# Patient Record
Sex: FEMALE | Race: BLACK OR AFRICAN AMERICAN | NOT HISPANIC OR LATINO | URBAN - METROPOLITAN AREA
[De-identification: names, ages, dates, MRNs, and addresses within clinical notes are randomized per-mention and may not be internally consistent; named-entity substitution may affect disease eponyms.]

---

## 2017-03-27 ENCOUNTER — EMERGENCY (EMERGENCY)
Facility: HOSPITAL | Age: 27
LOS: 1 days | Discharge: HOME | End: 2017-03-27
Attending: EMERGENCY MEDICINE | Admitting: EMERGENCY MEDICINE
Payer: MEDICAID

## 2017-03-27 VITALS
HEART RATE: 78 BPM | RESPIRATION RATE: 18 BRPM | DIASTOLIC BLOOD PRESSURE: 78 MMHG | SYSTOLIC BLOOD PRESSURE: 136 MMHG | TEMPERATURE: 97 F | OXYGEN SATURATION: 100 %

## 2017-03-27 VITALS
SYSTOLIC BLOOD PRESSURE: 129 MMHG | HEART RATE: 73 BPM | DIASTOLIC BLOOD PRESSURE: 68 MMHG | TEMPERATURE: 98 F | WEIGHT: 223.11 LBS | RESPIRATION RATE: 18 BRPM | OXYGEN SATURATION: 100 %

## 2017-03-27 DIAGNOSIS — Z91.013 ALLERGY TO SEAFOOD: ICD-10-CM

## 2017-03-27 DIAGNOSIS — B37.3 CANDIDIASIS OF VULVA AND VAGINA: ICD-10-CM

## 2017-03-27 DIAGNOSIS — L29.2 PRURITUS VULVAE: ICD-10-CM

## 2017-03-27 LAB
APPEARANCE UR: CLEAR — SIGNIFICANT CHANGE UP
BILIRUB UR-MCNC: NEGATIVE — SIGNIFICANT CHANGE UP
COLOR SPEC: YELLOW — SIGNIFICANT CHANGE UP
DIFF PNL FLD: NEGATIVE — SIGNIFICANT CHANGE UP
EXTRA URINE TUBE: SIGNIFICANT CHANGE UP
GLUCOSE UR QL: NEGATIVE — SIGNIFICANT CHANGE UP
KETONES UR-MCNC: NEGATIVE — SIGNIFICANT CHANGE UP
LEUKOCYTE ESTERASE UR-ACNC: (no result)
NITRITE UR-MCNC: NEGATIVE — SIGNIFICANT CHANGE UP
PH UR: 7 — SIGNIFICANT CHANGE UP (ref 4–8)
PROT UR-MCNC: NEGATIVE MG/DL — SIGNIFICANT CHANGE UP
SP GR SPEC: 1.01 — SIGNIFICANT CHANGE UP (ref 1–1.03)
UROBILINOGEN FLD QL: 0.2 E.U./DL — SIGNIFICANT CHANGE UP

## 2017-03-27 PROCEDURE — 99284 EMERGENCY DEPT VISIT MOD MDM: CPT

## 2017-03-27 PROCEDURE — 81001 URINALYSIS AUTO W/SCOPE: CPT

## 2017-03-27 PROCEDURE — 81003 URINALYSIS AUTO W/O SCOPE: CPT

## 2017-03-27 RX ORDER — IBUPROFEN 200 MG
800 TABLET ORAL ONCE
Qty: 0 | Refills: 0 | Status: COMPLETED | OUTPATIENT
Start: 2017-03-27 | End: 2017-03-27

## 2017-03-27 RX ORDER — FLUCONAZOLE 150 MG/1
150 TABLET ORAL ONCE
Qty: 0 | Refills: 0 | Status: COMPLETED | OUTPATIENT
Start: 2017-03-27 | End: 2017-03-27

## 2017-03-27 RX ADMIN — Medication 800 MILLIGRAM(S): at 10:56

## 2017-03-27 RX ADMIN — FLUCONAZOLE 150 MILLIGRAM(S): 150 TABLET ORAL at 10:04

## 2017-03-27 NOTE — ED PROVIDER NOTE - OBJECTIVE STATEMENT
26yoF here with vaginal itching and feels like there are bumps inside her vagina and is concerned for herpes and genital warts. Pt reports several years of dyspareunia, but states she has not addressed it with an OB-GYN. She also has had several months of abnormal smelling vaginal discharge, tried home/natural remedies with some improvement, but now back again, +itching. No dysuria or frequency, sexually active, uses condoms, remote hx of STIs but gets tested regularly and reports 100% compliance with condoms. No abnormal bleeding, did have abnormal pap during pregnancy of her son 3 years ago, scheduled for colposcopy today. No other complaints.

## 2017-03-27 NOTE — ED PROVIDER NOTE - MEDICAL DECISION MAKING DETAILS
26yoF here acutely for vaginal discharge and itching, exam shows vaginal candidiasis, treated with oral fluconazole in ED, gc/chl cultures sent. No lesions on exam noted. Pt referred to Ob-GYN for workup of dyspareunia, abnormal pap, and routine care. return precautions reviewed, verbalized understanding, agrees w/plan.

## 2017-03-27 NOTE — ED PROVIDER NOTE - PLAN OF CARE
you were treated for a yeast infection  drink plenty of water  continue to use condoms for protection   follow-up with the OB-GYN doctor for further outpatient evaluation for your symptoms  return if worse

## 2017-03-27 NOTE — ED PROVIDER NOTE - CARE PLAN
Principal Discharge DX:	Vaginal candidiasis  Instructions for follow-up, activity and diet:	you were treated for a yeast infection  drink plenty of water  continue to use condoms for protection   follow-up with the OB-GYN doctor for further outpatient evaluation for your symptoms  return if worse  Secondary Diagnosis:	Dyspareunia, female

## 2017-03-27 NOTE — ED ADULT TRIAGE NOTE - CHIEF COMPLAINT QUOTE
"I have a lot of burning and pain in my vaginal area on and off for the last year but the worst the last 2 days and there are bumps there" denies fever, chills

## 2017-03-28 LAB
C TRACH RRNA SPEC QL NAA+PROBE: SIGNIFICANT CHANGE UP
N GONORRHOEA RRNA SPEC QL NAA+PROBE: SIGNIFICANT CHANGE UP
SPECIMEN SOURCE: SIGNIFICANT CHANGE UP

## 2020-08-22 ENCOUNTER — TRANSCRIPTION ENCOUNTER (OUTPATIENT)
Age: 30
End: 2020-08-22

## 2020-11-16 ENCOUNTER — APPOINTMENT (OUTPATIENT)
Dept: HEART AND VASCULAR | Facility: CLINIC | Age: 30
End: 2020-11-16
Payer: MEDICAID

## 2020-11-16 VITALS
BODY MASS INDEX: 40.18 KG/M2 | DIASTOLIC BLOOD PRESSURE: 84 MMHG | TEMPERATURE: 97.6 F | SYSTOLIC BLOOD PRESSURE: 120 MMHG | WEIGHT: 250 LBS | HEIGHT: 66 IN | OXYGEN SATURATION: 98 %

## 2020-11-16 PROCEDURE — 99072 ADDL SUPL MATRL&STAF TM PHE: CPT

## 2020-11-16 PROCEDURE — 93000 ELECTROCARDIOGRAM COMPLETE: CPT

## 2020-11-16 PROCEDURE — 99204 OFFICE O/P NEW MOD 45 MIN: CPT

## 2020-11-16 NOTE — HISTORY OF PRESENT ILLNESS
[FreeTextEntry1] : Here for cardiac clearance prior to bariatric surgery next month\par normal functional capacity\par exercises several times a week with a \par no prior problems with anesthesia\par denies chest discomfort dyspnea, orthopnea, PND or palpitations

## 2020-11-16 NOTE — PHYSICAL EXAM
[General Appearance - Well Developed] : well developed [Normal Appearance] : normal appearance [Well Groomed] : well groomed [General Appearance - Well Nourished] : well nourished [No Deformities] : no deformities [General Appearance - In No Acute Distress] : no acute distress [Normal Conjunctiva] : the conjunctiva exhibited no abnormalities [Eyelids - No Xanthelasma] : the eyelids demonstrated no xanthelasmas [Normal Oral Mucosa] : normal oral mucosa [No Oral Pallor] : no oral pallor [No Oral Cyanosis] : no oral cyanosis [Normal Jugular Venous A Waves Present] : normal jugular venous A waves present [Normal Jugular Venous V Waves Present] : normal jugular venous V waves present [No Jugular Venous Linton A Waves] : no jugular venous linton A waves [Heart Rate And Rhythm] : heart rate and rhythm were normal [Heart Sounds] : normal S1 and S2 [Murmurs] : no murmurs present [Respiration, Rhythm And Depth] : normal respiratory rhythm and effort [Exaggerated Use Of Accessory Muscles For Inspiration] : no accessory muscle use [Auscultation Breath Sounds / Voice Sounds] : lungs were clear to auscultation bilaterally [Abdomen Soft] : soft [Abdomen Tenderness] : non-tender [Abdomen Mass (___ Cm)] : no abdominal mass palpated [Abnormal Walk] : normal gait [Gait - Sufficient For Exercise Testing] : the gait was sufficient for exercise testing [Skin Color & Pigmentation] : normal skin color and pigmentation [] : no rash [No Venous Stasis] : no venous stasis [Skin Lesions] : no skin lesions [No Skin Ulcers] : no skin ulcer [No Xanthoma] : no  xanthoma was observed [Oriented To Time, Place, And Person] : oriented to person, place, and time [Affect] : the affect was normal [Mood] : the mood was normal [No Anxiety] : not feeling anxious

## 2020-11-19 ENCOUNTER — APPOINTMENT (OUTPATIENT)
Dept: PULMONOLOGY | Facility: CLINIC | Age: 30
End: 2020-11-19

## 2020-11-23 ENCOUNTER — APPOINTMENT (OUTPATIENT)
Dept: HEART AND VASCULAR | Facility: CLINIC | Age: 30
End: 2020-11-23
Payer: MEDICAID

## 2020-11-23 ENCOUNTER — APPOINTMENT (OUTPATIENT)
Dept: HEART AND VASCULAR | Facility: CLINIC | Age: 30
End: 2020-11-23

## 2020-11-23 DIAGNOSIS — R94.31 ABNORMAL ELECTROCARDIOGRAM [ECG] [EKG]: ICD-10-CM

## 2020-11-23 PROCEDURE — 93306 TTE W/DOPPLER COMPLETE: CPT

## 2020-11-24 PROBLEM — R94.31 ABNORMAL ECG: Status: ACTIVE | Noted: 2020-11-16

## 2020-12-16 ENCOUNTER — APPOINTMENT (OUTPATIENT)
Dept: BARIATRICS | Facility: CLINIC | Age: 30
End: 2020-12-16
Payer: MEDICAID

## 2020-12-16 VITALS — HEIGHT: 66 IN | WEIGHT: 250 LBS | BODY MASS INDEX: 40.18 KG/M2

## 2020-12-16 VITALS — BODY MASS INDEX: 38.57 KG/M2 | HEIGHT: 66 IN | WEIGHT: 240 LBS

## 2020-12-16 PROCEDURE — 99203 OFFICE O/P NEW LOW 30 MIN: CPT

## 2020-12-16 PROCEDURE — 99072 ADDL SUPL MATRL&STAF TM PHE: CPT

## 2020-12-17 ENCOUNTER — APPOINTMENT (OUTPATIENT)
Dept: GASTROENTEROLOGY | Facility: CLINIC | Age: 30
End: 2020-12-17

## 2021-01-14 ENCOUNTER — APPOINTMENT (OUTPATIENT)
Dept: BARIATRICS | Facility: CLINIC | Age: 31
End: 2021-01-14
Payer: MEDICAID

## 2021-01-14 VITALS — WEIGHT: 250 LBS | BODY MASS INDEX: 40.35 KG/M2

## 2021-01-14 DIAGNOSIS — R73.03 PREDIABETES.: ICD-10-CM

## 2021-01-14 PROCEDURE — 97802 MEDICAL NUTRITION INDIV IN: CPT | Mod: 95

## 2021-01-25 ENCOUNTER — APPOINTMENT (OUTPATIENT)
Dept: HEART AND VASCULAR | Facility: CLINIC | Age: 31
End: 2021-01-25

## 2021-01-27 NOTE — HISTORY OF PRESENT ILLNESS
[de-identified] : Patient is a 30 year old female with along history of morbid obesity not responsive to multiple dietary regimens. She has a BMI of 40.4 and weight-related comorbidities including prediabetes.

## 2021-02-08 ENCOUNTER — NON-APPOINTMENT (OUTPATIENT)
Age: 31
End: 2021-02-08

## 2021-04-30 ENCOUNTER — EMERGENCY (EMERGENCY)
Facility: HOSPITAL | Age: 31
LOS: 1 days | Discharge: HOME | End: 2021-04-30
Attending: EMERGENCY MEDICINE | Admitting: EMERGENCY MEDICINE
Payer: MEDICAID

## 2021-04-30 VITALS
DIASTOLIC BLOOD PRESSURE: 81 MMHG | SYSTOLIC BLOOD PRESSURE: 127 MMHG | OXYGEN SATURATION: 98 % | TEMPERATURE: 98 F | HEART RATE: 85 BPM | HEIGHT: 66 IN | RESPIRATION RATE: 17 BRPM | WEIGHT: 220.02 LBS

## 2021-04-30 VITALS
HEART RATE: 70 BPM | OXYGEN SATURATION: 100 % | DIASTOLIC BLOOD PRESSURE: 68 MMHG | RESPIRATION RATE: 16 BRPM | SYSTOLIC BLOOD PRESSURE: 124 MMHG

## 2021-04-30 DIAGNOSIS — D64.9 ANEMIA, UNSPECIFIED: ICD-10-CM

## 2021-04-30 DIAGNOSIS — R10.31 RIGHT LOWER QUADRANT PAIN: ICD-10-CM

## 2021-04-30 DIAGNOSIS — R30.0 DYSURIA: ICD-10-CM

## 2021-04-30 DIAGNOSIS — R10.9 UNSPECIFIED ABDOMINAL PAIN: ICD-10-CM

## 2021-04-30 DIAGNOSIS — M41.9 SCOLIOSIS, UNSPECIFIED: ICD-10-CM

## 2021-04-30 DIAGNOSIS — R11.0 NAUSEA: ICD-10-CM

## 2021-04-30 DIAGNOSIS — R31.9 HEMATURIA, UNSPECIFIED: ICD-10-CM

## 2021-04-30 DIAGNOSIS — Z91.013 ALLERGY TO SEAFOOD: ICD-10-CM

## 2021-04-30 LAB
ALBUMIN SERPL ELPH-MCNC: 3.9 G/DL — SIGNIFICANT CHANGE UP (ref 3.3–5)
ALP SERPL-CCNC: 91 U/L — SIGNIFICANT CHANGE UP (ref 40–120)
ALT FLD-CCNC: 11 U/L — SIGNIFICANT CHANGE UP (ref 10–45)
ANION GAP SERPL CALC-SCNC: 11 MMOL/L — SIGNIFICANT CHANGE UP (ref 5–17)
ANISOCYTOSIS BLD QL: SLIGHT — SIGNIFICANT CHANGE UP
APPEARANCE UR: CLEAR — SIGNIFICANT CHANGE UP
APTT BLD: 32.2 SEC — SIGNIFICANT CHANGE UP (ref 27.5–35.5)
AST SERPL-CCNC: 14 U/L — SIGNIFICANT CHANGE UP (ref 10–40)
BASOPHILS # BLD AUTO: 0.07 K/UL — SIGNIFICANT CHANGE UP (ref 0–0.2)
BASOPHILS NFR BLD AUTO: 0.9 % — SIGNIFICANT CHANGE UP (ref 0–2)
BILIRUB SERPL-MCNC: <0.2 MG/DL — SIGNIFICANT CHANGE UP (ref 0.2–1.2)
BILIRUB UR-MCNC: NEGATIVE — SIGNIFICANT CHANGE UP
BUN SERPL-MCNC: 8 MG/DL — SIGNIFICANT CHANGE UP (ref 7–23)
BURR CELLS BLD QL SMEAR: PRESENT — SIGNIFICANT CHANGE UP
CALCIUM SERPL-MCNC: 9 MG/DL — SIGNIFICANT CHANGE UP (ref 8.4–10.5)
CHLORIDE SERPL-SCNC: 101 MMOL/L — SIGNIFICANT CHANGE UP (ref 96–108)
CO2 SERPL-SCNC: 27 MMOL/L — SIGNIFICANT CHANGE UP (ref 22–31)
COLOR SPEC: YELLOW — SIGNIFICANT CHANGE UP
CREAT SERPL-MCNC: 0.71 MG/DL — SIGNIFICANT CHANGE UP (ref 0.5–1.3)
DIFF PNL FLD: NEGATIVE — SIGNIFICANT CHANGE UP
ELLIPTOCYTES BLD QL SMEAR: SLIGHT — SIGNIFICANT CHANGE UP
EOSINOPHIL # BLD AUTO: 0.07 K/UL — SIGNIFICANT CHANGE UP (ref 0–0.5)
EOSINOPHIL NFR BLD AUTO: 0.9 % — SIGNIFICANT CHANGE UP (ref 0–6)
GLUCOSE SERPL-MCNC: 185 MG/DL — HIGH (ref 70–99)
GLUCOSE UR QL: NEGATIVE — SIGNIFICANT CHANGE UP
HCG SERPL-ACNC: <0 MIU/ML — SIGNIFICANT CHANGE UP
HCT VFR BLD CALC: 28.7 % — LOW (ref 34.5–45)
HGB BLD-MCNC: 8.7 G/DL — LOW (ref 11.5–15.5)
HYPOCHROMIA BLD QL: SLIGHT — SIGNIFICANT CHANGE UP
INR BLD: 1.05 — SIGNIFICANT CHANGE UP (ref 0.88–1.16)
KETONES UR-MCNC: NEGATIVE — SIGNIFICANT CHANGE UP
LEUKOCYTE ESTERASE UR-ACNC: NEGATIVE — SIGNIFICANT CHANGE UP
LIDOCAIN IGE QN: 23 U/L — SIGNIFICANT CHANGE UP (ref 7–60)
LYMPHOCYTES # BLD AUTO: 3.53 K/UL — HIGH (ref 1–3.3)
LYMPHOCYTES # BLD AUTO: 43.8 % — SIGNIFICANT CHANGE UP (ref 13–44)
MANUAL SMEAR VERIFICATION: SIGNIFICANT CHANGE UP
MCHC RBC-ENTMCNC: 21.2 PG — LOW (ref 27–34)
MCHC RBC-ENTMCNC: 30.3 GM/DL — LOW (ref 32–36)
MCV RBC AUTO: 69.8 FL — LOW (ref 80–100)
MICROCYTES BLD QL: SLIGHT — SIGNIFICANT CHANGE UP
MONOCYTES # BLD AUTO: 0.57 K/UL — SIGNIFICANT CHANGE UP (ref 0–0.9)
MONOCYTES NFR BLD AUTO: 7.1 % — SIGNIFICANT CHANGE UP (ref 2–14)
NEUTROPHILS # BLD AUTO: 3.81 K/UL — SIGNIFICANT CHANGE UP (ref 1.8–7.4)
NEUTROPHILS NFR BLD AUTO: 47.3 % — SIGNIFICANT CHANGE UP (ref 43–77)
NITRITE UR-MCNC: NEGATIVE — SIGNIFICANT CHANGE UP
NRBC # BLD: 1 /100 — HIGH (ref 0–0)
NRBC # BLD: SIGNIFICANT CHANGE UP /100 WBCS (ref 0–0)
OVALOCYTES BLD QL SMEAR: SLIGHT — SIGNIFICANT CHANGE UP
PH UR: 6 — SIGNIFICANT CHANGE UP (ref 5–8)
PLAT MORPH BLD: ABNORMAL
PLATELET # BLD AUTO: 417 K/UL — HIGH (ref 150–400)
POIKILOCYTOSIS BLD QL AUTO: SLIGHT — SIGNIFICANT CHANGE UP
POLYCHROMASIA BLD QL SMEAR: SLIGHT — SIGNIFICANT CHANGE UP
POTASSIUM SERPL-MCNC: 4 MMOL/L — SIGNIFICANT CHANGE UP (ref 3.5–5.3)
POTASSIUM SERPL-SCNC: 4 MMOL/L — SIGNIFICANT CHANGE UP (ref 3.5–5.3)
PROT SERPL-MCNC: 7.1 G/DL — SIGNIFICANT CHANGE UP (ref 6–8.3)
PROT UR-MCNC: NEGATIVE MG/DL — SIGNIFICANT CHANGE UP
PROTHROM AB SERPL-ACNC: 12.6 SEC — SIGNIFICANT CHANGE UP (ref 10.6–13.6)
RBC # BLD: 4.11 M/UL — SIGNIFICANT CHANGE UP (ref 3.8–5.2)
RBC # FLD: 16.1 % — HIGH (ref 10.3–14.5)
RBC BLD AUTO: ABNORMAL
SCHISTOCYTES BLD QL AUTO: SLIGHT — SIGNIFICANT CHANGE UP
SMUDGE CELLS # BLD: PRESENT — SIGNIFICANT CHANGE UP
SODIUM SERPL-SCNC: 139 MMOL/L — SIGNIFICANT CHANGE UP (ref 135–145)
SP GR SPEC: 1.02 — SIGNIFICANT CHANGE UP (ref 1–1.03)
UROBILINOGEN FLD QL: 0.2 E.U./DL — SIGNIFICANT CHANGE UP
WBC # BLD: 8.06 K/UL — SIGNIFICANT CHANGE UP (ref 3.8–10.5)
WBC # FLD AUTO: 8.06 K/UL — SIGNIFICANT CHANGE UP (ref 3.8–10.5)

## 2021-04-30 PROCEDURE — 74176 CT ABD & PELVIS W/O CONTRAST: CPT

## 2021-04-30 PROCEDURE — 85610 PROTHROMBIN TIME: CPT

## 2021-04-30 PROCEDURE — 87184 SC STD DISK METHOD PER PLATE: CPT

## 2021-04-30 PROCEDURE — 96375 TX/PRO/DX INJ NEW DRUG ADDON: CPT

## 2021-04-30 PROCEDURE — 85025 COMPLETE CBC W/AUTO DIFF WBC: CPT

## 2021-04-30 PROCEDURE — 81003 URINALYSIS AUTO W/O SCOPE: CPT

## 2021-04-30 PROCEDURE — 85730 THROMBOPLASTIN TIME PARTIAL: CPT

## 2021-04-30 PROCEDURE — 36415 COLL VENOUS BLD VENIPUNCTURE: CPT

## 2021-04-30 PROCEDURE — 87086 URINE CULTURE/COLONY COUNT: CPT

## 2021-04-30 PROCEDURE — G1004: CPT

## 2021-04-30 PROCEDURE — 99285 EMERGENCY DEPT VISIT HI MDM: CPT

## 2021-04-30 PROCEDURE — 99284 EMERGENCY DEPT VISIT MOD MDM: CPT | Mod: 25

## 2021-04-30 PROCEDURE — 96361 HYDRATE IV INFUSION ADD-ON: CPT

## 2021-04-30 PROCEDURE — 84702 CHORIONIC GONADOTROPIN TEST: CPT

## 2021-04-30 PROCEDURE — 83690 ASSAY OF LIPASE: CPT

## 2021-04-30 PROCEDURE — 80053 COMPREHEN METABOLIC PANEL: CPT

## 2021-04-30 PROCEDURE — 96374 THER/PROPH/DIAG INJ IV PUSH: CPT

## 2021-04-30 PROCEDURE — 74176 CT ABD & PELVIS W/O CONTRAST: CPT | Mod: 26,ME

## 2021-04-30 RX ORDER — SODIUM CHLORIDE 9 MG/ML
1000 INJECTION INTRAMUSCULAR; INTRAVENOUS; SUBCUTANEOUS ONCE
Refills: 0 | Status: COMPLETED | OUTPATIENT
Start: 2021-04-30 | End: 2021-04-30

## 2021-04-30 RX ORDER — MORPHINE SULFATE 50 MG/1
4 CAPSULE, EXTENDED RELEASE ORAL ONCE
Refills: 0 | Status: DISCONTINUED | OUTPATIENT
Start: 2021-04-30 | End: 2021-04-30

## 2021-04-30 RX ORDER — OXYCODONE AND ACETAMINOPHEN 5; 325 MG/1; MG/1
1 TABLET ORAL ONCE
Refills: 0 | Status: DISCONTINUED | OUTPATIENT
Start: 2021-04-30 | End: 2021-04-30

## 2021-04-30 RX ORDER — CEFUROXIME AXETIL 250 MG
1 TABLET ORAL
Qty: 20 | Refills: 0
Start: 2021-04-30 | End: 2021-05-09

## 2021-04-30 RX ORDER — KETOROLAC TROMETHAMINE 30 MG/ML
30 SYRINGE (ML) INJECTION ONCE
Refills: 0 | Status: DISCONTINUED | OUTPATIENT
Start: 2021-04-30 | End: 2021-04-30

## 2021-04-30 RX ADMIN — MORPHINE SULFATE 4 MILLIGRAM(S): 50 CAPSULE, EXTENDED RELEASE ORAL at 03:26

## 2021-04-30 RX ADMIN — SODIUM CHLORIDE 1000 MILLILITER(S): 9 INJECTION INTRAMUSCULAR; INTRAVENOUS; SUBCUTANEOUS at 03:25

## 2021-04-30 RX ADMIN — Medication 30 MILLIGRAM(S): at 06:28

## 2021-04-30 RX ADMIN — OXYCODONE AND ACETAMINOPHEN 1 TABLET(S): 5; 325 TABLET ORAL at 08:17

## 2021-04-30 RX ADMIN — SODIUM CHLORIDE 1000 MILLILITER(S): 9 INJECTION INTRAMUSCULAR; INTRAVENOUS; SUBCUTANEOUS at 05:45

## 2021-04-30 RX ADMIN — MORPHINE SULFATE 4 MILLIGRAM(S): 50 CAPSULE, EXTENDED RELEASE ORAL at 03:45

## 2021-04-30 RX ADMIN — SODIUM CHLORIDE 1000 MILLILITER(S): 9 INJECTION INTRAMUSCULAR; INTRAVENOUS; SUBCUTANEOUS at 04:50

## 2021-04-30 NOTE — ED ADULT NURSE REASSESSMENT NOTE - NS ED NURSE REASSESS COMMENT FT1
Patient discharged by Night RN Lorena, received percocet at 8:15AM, not willing to leave, patient states, "I am in a lot of pain, I am waiting for the pain medicine to work."

## 2021-04-30 NOTE — ED ADULT NURSE NOTE - OBJECTIVE STATEMENT
Pt presents with c/o bilateral "flank, back pain" with radiation to BLQ x 3-4 days. Reports urinary frequency and urgency, +nausea, no V/D.

## 2021-04-30 NOTE — ED PROVIDER NOTE - PATIENT PORTAL LINK FT
You can access the FollowMyHealth Patient Portal offered by [facility] by registering at the following website: [email or web address]. By joining [facility]’s FollowMyHealth portal, you will also be able to view your health information using other applications (apps) compatible with our system.

## 2021-04-30 NOTE — ED PROVIDER NOTE - OBJECTIVE STATEMENT
30F hx anemia, scoliosis, c/o left flank pain. pt states ongoing since yesterday. states pain radiates to left groin.  +dysuria and hematuria. no vomiting, felt a little nauseated yesterday.  took motrin 2 hrs prior to arrival without relief.  no fevers. no sick contacts.

## 2021-04-30 NOTE — ED PROVIDER NOTE - CLINICAL SUMMARY MEDICAL DECISION MAKING FREE TEXT BOX
left flank pain radiating to llq, dysuria, hematuria, afebrile  -check labs, ua  -ivf, morphine  -CT a/p

## 2021-04-30 NOTE — ED PROVIDER NOTE - NSFOLLOWUPINSTRUCTIONS_ED_ALL_ED_FT
Your urinalaysis is negative for infection today.      Abdominal Pain    Many things can cause abdominal pain. Many times, abdominal pain is not caused by a disease and will improve without treatment. Your health care provider will do a physical exam to determine if there is a dangerous cause of your pain; blood tests and imaging may help determine the cause of your pain. However, in many cases, no cause may be found and you may need further testing as an outpatient. Monitor your abdominal pain for any changes.     SEEK IMMEDIATE MEDICAL CARE IF YOU HAVE ANY OF THE FOLLOWING SYMPTOMS: worsening abdominal pain, uncontrollable vomiting, profuse diarrhea, inability to have bowel movements or pass gas, black or bloody stools, fever accompanying chest pain or back pain, or fainting. These symptoms may represent a serious problem that is an emergency. Do not wait to see if the symptoms will go away. Get medical help right away. Call 911 and do not drive yourself to the hospital.

## 2021-04-30 NOTE — ED ADULT TRIAGE NOTE - CHIEF COMPLAINT QUOTE
pt c/o lower abd pressure and  b/l back/ flank pain x 3-4 days with hematuria beginning today. motrin 2 hours pta.

## 2021-05-02 LAB
-  AMPICILLIN: SIGNIFICANT CHANGE UP
-  CLINDAMYCIN: SIGNIFICANT CHANGE UP
-  ERYTHROMYCIN: SIGNIFICANT CHANGE UP
-  LEVOFLOXACIN: SIGNIFICANT CHANGE UP
-  PENICILLIN: SIGNIFICANT CHANGE UP
-  VANCOMYCIN: SIGNIFICANT CHANGE UP
CULTURE RESULTS: SIGNIFICANT CHANGE UP
METHOD TYPE: SIGNIFICANT CHANGE UP
ORGANISM # SPEC MICROSCOPIC CNT: SIGNIFICANT CHANGE UP
ORGANISM # SPEC MICROSCOPIC CNT: SIGNIFICANT CHANGE UP
SPECIMEN SOURCE: SIGNIFICANT CHANGE UP

## 2021-05-03 PROBLEM — D64.9 ANEMIA, UNSPECIFIED: Chronic | Status: ACTIVE | Noted: 2021-04-30

## 2021-05-03 PROBLEM — M41.9 SCOLIOSIS, UNSPECIFIED: Chronic | Status: ACTIVE | Noted: 2021-04-30

## 2021-05-05 ENCOUNTER — APPOINTMENT (OUTPATIENT)
Dept: BARIATRICS | Facility: CLINIC | Age: 31
End: 2021-05-05

## 2021-05-15 ENCOUNTER — EMERGENCY (EMERGENCY)
Facility: HOSPITAL | Age: 31
LOS: 1 days | Discharge: HOME | End: 2021-05-15
Attending: EMERGENCY MEDICINE | Admitting: EMERGENCY MEDICINE
Payer: MEDICAID

## 2021-05-15 VITALS
HEIGHT: 66 IN | TEMPERATURE: 98 F | OXYGEN SATURATION: 99 % | DIASTOLIC BLOOD PRESSURE: 73 MMHG | SYSTOLIC BLOOD PRESSURE: 129 MMHG | HEART RATE: 81 BPM | WEIGHT: 220.02 LBS | RESPIRATION RATE: 16 BRPM

## 2021-05-15 VITALS
SYSTOLIC BLOOD PRESSURE: 112 MMHG | TEMPERATURE: 98 F | HEART RATE: 77 BPM | OXYGEN SATURATION: 100 % | DIASTOLIC BLOOD PRESSURE: 71 MMHG | RESPIRATION RATE: 16 BRPM

## 2021-05-15 DIAGNOSIS — M54.5 LOW BACK PAIN: ICD-10-CM

## 2021-05-15 DIAGNOSIS — J45.909 UNSPECIFIED ASTHMA, UNCOMPLICATED: ICD-10-CM

## 2021-05-15 DIAGNOSIS — H66.92 OTITIS MEDIA, UNSPECIFIED, LEFT EAR: ICD-10-CM

## 2021-05-15 PROCEDURE — 99283 EMERGENCY DEPT VISIT LOW MDM: CPT

## 2021-05-15 PROCEDURE — 99284 EMERGENCY DEPT VISIT MOD MDM: CPT

## 2021-05-15 RX ORDER — IBUPROFEN 200 MG
600 TABLET ORAL ONCE
Refills: 0 | Status: COMPLETED | OUTPATIENT
Start: 2021-05-15 | End: 2021-05-15

## 2021-05-15 RX ADMIN — Medication 600 MILLIGRAM(S): at 15:03

## 2021-05-15 NOTE — ED ADULT TRIAGE NOTE - CHIEF COMPLAINT QUOTE
Pt reports lower back pain, tingling to bilateral feet x months, denies bowel/bladder changes. Pt also reports left ear pain x 3 days, headache starting yesterday and fever this AM of 101.2F, last tylenol was this morning.

## 2021-05-15 NOTE — ED PROVIDER NOTE - PATIENT PORTAL LINK FT
You can access the FollowMyHealth Patient Portal offered by [facility] by registering at the following website: [email or web address]. By joining [facility]’s FollowMyHealth portal, you will also be able to view your health information using other applications (apps) compatible with our system.

## 2021-05-15 NOTE — ED PROVIDER NOTE - PHYSICAL EXAMINATION
CONSTITUTIONAL: Well appearing, well nourished, awake, alert and in no apparent distress.  HEENT: No mastoid tenderness. Slight pain with pulling L tragus. Small amount of fluid behind TM on L. No meningismus. Head is atraumatic. Eyes clear bilaterally, normal EOMI. Airway patent.  CARDIAC: Normal rate, regular rhythm.  Heart sounds S1, S2.   RESPIRATORY: Breath sounds clear and equal bilaterally. no tachypnea, respiratory distress.   GASTROINTESTINAL: Abdomen soft, non-tender, no guarding, distension.  MUSCULOSKELETAL: Mild lumbar paraspinal tenderness. Spine appears normal, no midline spinal tenderness, range of motion is not limited, no joint tenderness. no bony tenderness. no JVD, peripheral edema.   NEUROLOGICAL: AAO x 3, CN II-XII intact, normal speech, strength 5/5 bilateral upper and lower extremities, sensation intact, cerebellum intact, no ataxia, follows commands appropriately   SKIN: Skin normal color for race, warm, dry and intact. No evidence of rash.  PSYCHIATRIC: Alert and oriented to person, place, time/situation. normal mood and affect. no apparent risk to self or others. CONSTITUTIONAL: Well appearing, well nourished, awake, alert and in no apparent distress.  HEENT: No mastoid tenderness. Slight pain with pulling L tragus. Small amount of fluid behind TM on L. No meningismus. Head is atraumatic. Eyes clear bilaterally, normal EOMI. Airway patent.  CARDIAC: Normal rate, regular rhythm.  Heart sounds S1, S2.   RESPIRATORY: Breath sounds clear and equal bilaterally. no tachypnea, respiratory distress.   GASTROINTESTINAL: Abdomen soft, non-tender, no guarding, distension.  MUSCULOSKELETAL: Mild lumbar paraspinal tenderness. Spine appears normal, no midline spinal tenderness, range of motion is not limited, no joint tenderness. no bony tenderness. no JVD, peripheral edema.   NEUROLOGICAL: Patient is alert, oriented x person, place and time.  Cranial nerves 2-12 are intact.  Normal gait and speech.  Cerebellar testing normal:  negative Romberg, normal coordination and normal finger to nose, heal to shin and rapid alternating movements.  Normal proprioception and sensory exam.  No pronator drift.  5/5 bl upper extremity and lower extremity strength.    SKIN: Skin normal color for race, warm, dry and intact. No evidence of rash.  PSYCHIATRIC: Alert and oriented to person, place, time/situation. normal mood and affect. no apparent risk to self or others.

## 2021-05-15 NOTE — ED ADULT NURSE NOTE - CHPI ED NUR SYMPTOMS NEG
no anorexia/no bladder dysfunction/no bowel dysfunction/no constipation/no difficulty bearing weight/no fatigue/no motor function loss/no neck tenderness/no numbness

## 2021-05-15 NOTE — ED PROVIDER NOTE - CLINICAL SUMMARY MEDICAL DECISION MAKING FREE TEXT BOX
31 y/o F with multiple complaints as above. No acute neurological deficits, nontoxic appearing. Will treat headache and L otitis media pain with ibuprofen. 29 y/o F with multiple complaints as above. No acute neurological deficits, nontoxic appearing. Will treat headache and L otitis media pain.

## 2021-05-15 NOTE — ED PROVIDER NOTE - NSFOLLOWUPINSTRUCTIONS_ED_ALL_ED_FT
Can take tylenol 650mg AND/OR motrin 600mg (May cause stomach issues - take with food and avoid prolonged use) every 6hrs as needed for pain.  Follow up with primary doctor within 1-2 days.  Return to ER immediately for fevers, persistent vomit, uncontrolled pain, incontinence, focal weakness/numbness, worsening dizziness.   Follow up with spine specialist for persistent pain.   Can call [phone] to schedule appointment or go online [email or web address]    Back Pain    Back pain is very common in adults. The cause of back pain is rarely dangerous and the pain often gets better over time. The cause of your back pain may not be known and may include strain of muscles or ligaments, degeneration of the spinal disks, or arthritis. Occasionally the pain may radiate down your leg(s). Over-the-counter medicines to reduce pain and inflammation are often the most helpful. Stretching and remaining active frequently helps the healing process.     SEEK IMMEDIATE MEDICAL CARE IF YOU HAVE ANY OF THE FOLLOWING SYMPTOMS: bowel or bladder control problems, unusual weakness or numbness in your arms or legs, nausea or vomiting, abdominal pain, fever, dizziness/lightheadedness.

## 2021-05-15 NOTE — ED ADULT NURSE NOTE - OBJECTIVE STATEMENT
Pt reports lower back pain, tingling to bilateral feet x months, denies bowel/bladder changes. Pt also reports left ear pain x 3 days, headache starting yesterday and fever this AM of 101.2F, last tylenol was this morning.  Denies chest pain, SOB, cough, /GI symptoms, D/N/V.  Pt is alert and oriented x3.  Denies trauma or injury.

## 2021-05-15 NOTE — ED PROVIDER NOTE - OBJECTIVE STATEMENT
29 y/o F with PMHx of scoliosis, asthma, coming into the ED for multiple medical complaints. 1) Patient presenting with headache since last night, gradual in onset, states had similar headaches before during COVID. Patient tried tylenol with improvement. No associated UE or LE weakness or numbness, speech difficulty, fever, chills, neck pain, trauma, vomiting, vision changes. 2) Patient complains of L ear pain x3d, states had otitis media in the past and feels similar, went swimming over weekend which worsened the symptoms. No discharge, fever, has not tried any medication for symptoms. 3) Complaints of lower back pain for the past 4 months with pain in lower lumbar back. Denies HA, SOB/CP, lightheaded, vision changes, NVD, abd pain, urinary complaints, black/bloody stool, back pain, focal weakness/numbness, vertiginous symptoms, difficulty ambulating, URI symptoms. Normal PO intake. 31 y/o F with PMHx of scoliosis, asthma, coming into the ED for multiple medical complaints. 1) Patient presenting with headache since last night, gradual in onset, states had similar headaches before during COVID. Patient tried tylenol with improvement. No associated UE or LE weakness or numbness, speech difficulty, fever, chills, neck pain, trauma, vomiting, vision changes. 2) Patient complains of L ear pain x3d, states had otitis media in the past and feels similar, went swimming over weekend which worsened the symptoms. No discharge, fever, has not tried any medication for symptoms. 3) Complaints of lower back pain for the past 4 months with pain in lower lumbar back. Denies any focal weakness/numbness, urinary complaints, incontinence, f/c, trauma, SOB/CP, abd pain, NVD, recent weight loss or night sweats. No hx IVDU. Cannot recall any specific precipitating trauma.

## 2021-05-26 ENCOUNTER — APPOINTMENT (OUTPATIENT)
Dept: BARIATRICS | Facility: CLINIC | Age: 31
End: 2021-05-26

## 2021-06-30 ENCOUNTER — APPOINTMENT (OUTPATIENT)
Dept: BARIATRICS | Facility: CLINIC | Age: 31
End: 2021-06-30

## 2021-07-28 ENCOUNTER — APPOINTMENT (OUTPATIENT)
Dept: BARIATRICS | Facility: CLINIC | Age: 31
End: 2021-07-28
Payer: MEDICAID

## 2021-07-28 VITALS
OXYGEN SATURATION: 97 % | HEART RATE: 91 BPM | BODY MASS INDEX: 40.74 KG/M2 | SYSTOLIC BLOOD PRESSURE: 136 MMHG | DIASTOLIC BLOOD PRESSURE: 86 MMHG | HEIGHT: 66 IN | WEIGHT: 253.5 LBS | TEMPERATURE: 97.3 F

## 2021-07-28 PROCEDURE — 99212 OFFICE O/P EST SF 10 MIN: CPT

## 2021-08-10 ENCOUNTER — APPOINTMENT (OUTPATIENT)
Dept: BARIATRICS | Facility: CLINIC | Age: 31
End: 2021-08-10

## 2021-08-10 ENCOUNTER — NON-APPOINTMENT (OUTPATIENT)
Age: 31
End: 2021-08-10

## 2021-08-22 NOTE — HISTORY OF PRESENT ILLNESS
[de-identified] : Patient is scheduled for a laparoscopic sleeve gastrectomy. She is still trying to complete the preoperative workup. She is working with our dietitian to lose some weight preoperatively.

## 2021-12-19 ENCOUNTER — EMERGENCY (EMERGENCY)
Facility: HOSPITAL | Age: 31
LOS: 1 days | Discharge: HOME | End: 2021-12-19
Attending: EMERGENCY MEDICINE | Admitting: EMERGENCY MEDICINE
Payer: MEDICAID

## 2021-12-19 VITALS
RESPIRATION RATE: 18 BRPM | WEIGHT: 199.96 LBS | SYSTOLIC BLOOD PRESSURE: 126 MMHG | HEART RATE: 79 BPM | HEIGHT: 66 IN | TEMPERATURE: 98 F | OXYGEN SATURATION: 100 % | DIASTOLIC BLOOD PRESSURE: 74 MMHG

## 2021-12-19 VITALS
HEART RATE: 76 BPM | DIASTOLIC BLOOD PRESSURE: 87 MMHG | TEMPERATURE: 99 F | RESPIRATION RATE: 18 BRPM | SYSTOLIC BLOOD PRESSURE: 126 MMHG | OXYGEN SATURATION: 100 %

## 2021-12-19 DIAGNOSIS — O99.891 OTHER SPECIFIED DISEASES AND CONDITIONS COMPLICATING PREGNANCY: ICD-10-CM

## 2021-12-19 DIAGNOSIS — R10.30 LOWER ABDOMINAL PAIN, UNSPECIFIED: ICD-10-CM

## 2021-12-19 DIAGNOSIS — Z91.013 ALLERGY TO SEAFOOD: ICD-10-CM

## 2021-12-19 DIAGNOSIS — Z3A.09 9 WEEKS GESTATION OF PREGNANCY: ICD-10-CM

## 2021-12-19 DIAGNOSIS — Z20.822 CONTACT WITH AND (SUSPECTED) EXPOSURE TO COVID-19: ICD-10-CM

## 2021-12-19 DIAGNOSIS — Z87.891 PERSONAL HISTORY OF NICOTINE DEPENDENCE: ICD-10-CM

## 2021-12-19 LAB
ALBUMIN SERPL ELPH-MCNC: 4.2 G/DL — SIGNIFICANT CHANGE UP (ref 3.3–5)
ALP SERPL-CCNC: 83 U/L — SIGNIFICANT CHANGE UP (ref 40–120)
ALT FLD-CCNC: 11 U/L — SIGNIFICANT CHANGE UP (ref 10–45)
ANION GAP SERPL CALC-SCNC: 12 MMOL/L — SIGNIFICANT CHANGE UP (ref 5–17)
ANISOCYTOSIS BLD QL: SLIGHT — SIGNIFICANT CHANGE UP
APTT BLD: 29.2 SEC — SIGNIFICANT CHANGE UP (ref 27.5–35.5)
AST SERPL-CCNC: 14 U/L — SIGNIFICANT CHANGE UP (ref 10–40)
BASOPHILS # BLD AUTO: 0.09 K/UL — SIGNIFICANT CHANGE UP (ref 0–0.2)
BASOPHILS NFR BLD AUTO: 0.9 % — SIGNIFICANT CHANGE UP (ref 0–2)
BILIRUB SERPL-MCNC: <0.2 MG/DL — SIGNIFICANT CHANGE UP (ref 0.2–1.2)
BLD GP AB SCN SERPL QL: NEGATIVE — SIGNIFICANT CHANGE UP
BUN SERPL-MCNC: 7 MG/DL — SIGNIFICANT CHANGE UP (ref 7–23)
CALCIUM SERPL-MCNC: 9.3 MG/DL — SIGNIFICANT CHANGE UP (ref 8.4–10.5)
CHLORIDE SERPL-SCNC: 101 MMOL/L — SIGNIFICANT CHANGE UP (ref 96–108)
CO2 SERPL-SCNC: 22 MMOL/L — SIGNIFICANT CHANGE UP (ref 22–31)
CREAT SERPL-MCNC: 0.66 MG/DL — SIGNIFICANT CHANGE UP (ref 0.5–1.3)
EOSINOPHIL # BLD AUTO: 0.09 K/UL — SIGNIFICANT CHANGE UP (ref 0–0.5)
EOSINOPHIL NFR BLD AUTO: 0.9 % — SIGNIFICANT CHANGE UP (ref 0–6)
GIANT PLATELETS BLD QL SMEAR: PRESENT — SIGNIFICANT CHANGE UP
GLUCOSE SERPL-MCNC: 132 MG/DL — HIGH (ref 70–99)
HCG SERPL-ACNC: HIGH MIU/ML
HCT VFR BLD CALC: 32.9 % — LOW (ref 34.5–45)
HGB BLD-MCNC: 10.4 G/DL — LOW (ref 11.5–15.5)
HIV 1+2 AB+HIV1 P24 AG SERPL QL IA: SIGNIFICANT CHANGE UP
HYPOCHROMIA BLD QL: SIGNIFICANT CHANGE UP
INR BLD: 1.11 — SIGNIFICANT CHANGE UP (ref 0.88–1.16)
LYMPHOCYTES # BLD AUTO: 2.64 K/UL — SIGNIFICANT CHANGE UP (ref 1–3.3)
LYMPHOCYTES # BLD AUTO: 26.8 % — SIGNIFICANT CHANGE UP (ref 13–44)
MANUAL SMEAR VERIFICATION: SIGNIFICANT CHANGE UP
MCHC RBC-ENTMCNC: 22.2 PG — LOW (ref 27–34)
MCHC RBC-ENTMCNC: 31.6 GM/DL — LOW (ref 32–36)
MCV RBC AUTO: 70.3 FL — LOW (ref 80–100)
MICROCYTES BLD QL: SIGNIFICANT CHANGE UP
MONOCYTES # BLD AUTO: 0.88 K/UL — SIGNIFICANT CHANGE UP (ref 0–0.9)
MONOCYTES NFR BLD AUTO: 8.9 % — SIGNIFICANT CHANGE UP (ref 2–14)
NEUTROPHILS # BLD AUTO: 6.15 K/UL — SIGNIFICANT CHANGE UP (ref 1.8–7.4)
NEUTROPHILS NFR BLD AUTO: 62.5 % — SIGNIFICANT CHANGE UP (ref 43–77)
OVALOCYTES BLD QL SMEAR: SLIGHT — SIGNIFICANT CHANGE UP
PLAT MORPH BLD: ABNORMAL
PLATELET # BLD AUTO: 378 K/UL — SIGNIFICANT CHANGE UP (ref 150–400)
POIKILOCYTOSIS BLD QL AUTO: SLIGHT — SIGNIFICANT CHANGE UP
POLYCHROMASIA BLD QL SMEAR: SLIGHT — SIGNIFICANT CHANGE UP
POTASSIUM SERPL-MCNC: 4.2 MMOL/L — SIGNIFICANT CHANGE UP (ref 3.5–5.3)
POTASSIUM SERPL-SCNC: 4.2 MMOL/L — SIGNIFICANT CHANGE UP (ref 3.5–5.3)
PROT SERPL-MCNC: 7.5 G/DL — SIGNIFICANT CHANGE UP (ref 6–8.3)
PROTHROM AB SERPL-ACNC: 13.2 SEC — SIGNIFICANT CHANGE UP (ref 10.6–13.6)
RBC # BLD: 4.68 M/UL — SIGNIFICANT CHANGE UP (ref 3.8–5.2)
RBC # FLD: 17.9 % — HIGH (ref 10.3–14.5)
RBC BLD AUTO: ABNORMAL
RH IG SCN BLD-IMP: POSITIVE — SIGNIFICANT CHANGE UP
SARS-COV-2 RNA SPEC QL NAA+PROBE: SIGNIFICANT CHANGE UP
SCHISTOCYTES BLD QL AUTO: SLIGHT — SIGNIFICANT CHANGE UP
SODIUM SERPL-SCNC: 135 MMOL/L — SIGNIFICANT CHANGE UP (ref 135–145)
WBC # BLD: 9.84 K/UL — SIGNIFICANT CHANGE UP (ref 3.8–10.5)
WBC # FLD AUTO: 9.84 K/UL — SIGNIFICANT CHANGE UP (ref 3.8–10.5)

## 2021-12-19 PROCEDURE — 76801 OB US < 14 WKS SINGLE FETUS: CPT | Mod: 26

## 2021-12-19 PROCEDURE — 36415 COLL VENOUS BLD VENIPUNCTURE: CPT

## 2021-12-19 PROCEDURE — 76801 OB US < 14 WKS SINGLE FETUS: CPT

## 2021-12-19 PROCEDURE — 99284 EMERGENCY DEPT VISIT MOD MDM: CPT | Mod: 25

## 2021-12-19 PROCEDURE — 87389 HIV-1 AG W/HIV-1&-2 AB AG IA: CPT

## 2021-12-19 PROCEDURE — U0003: CPT

## 2021-12-19 PROCEDURE — 80053 COMPREHEN METABOLIC PANEL: CPT

## 2021-12-19 PROCEDURE — 85610 PROTHROMBIN TIME: CPT

## 2021-12-19 PROCEDURE — 87591 N.GONORRHOEAE DNA AMP PROB: CPT

## 2021-12-19 PROCEDURE — 86900 BLOOD TYPING SEROLOGIC ABO: CPT

## 2021-12-19 PROCEDURE — 85025 COMPLETE CBC W/AUTO DIFF WBC: CPT

## 2021-12-19 PROCEDURE — U0005: CPT

## 2021-12-19 PROCEDURE — 87491 CHLMYD TRACH DNA AMP PROBE: CPT

## 2021-12-19 PROCEDURE — 76817 TRANSVAGINAL US OBSTETRIC: CPT | Mod: 26

## 2021-12-19 PROCEDURE — 86901 BLOOD TYPING SEROLOGIC RH(D): CPT

## 2021-12-19 PROCEDURE — 84702 CHORIONIC GONADOTROPIN TEST: CPT

## 2021-12-19 PROCEDURE — 99285 EMERGENCY DEPT VISIT HI MDM: CPT

## 2021-12-19 PROCEDURE — 76817 TRANSVAGINAL US OBSTETRIC: CPT

## 2021-12-19 PROCEDURE — 86850 RBC ANTIBODY SCREEN: CPT

## 2021-12-19 PROCEDURE — 85730 THROMBOPLASTIN TIME PARTIAL: CPT

## 2021-12-19 NOTE — ED PROVIDER NOTE - OBJECTIVE STATEMENT
32 y/o F G4, P2, A1 with LNP early october. Presents to the ED with a CC of lower abdominal cramping lasting for x2 days. She states having a positive home pregnancy test last week. Reports having period like cramps with light spotting 2 days ago. She states the bleeding has resolved but has intermittent cramping. She has not seen an OBGYN during this pregnancy. Pt is requesting to have STD testing done today but denies having any vaginal discharge or urinary symptoms. 32 y/o F A1 with LMP early October presents to the ED with a CC of lower abdominal cramping lasting for x2 days. She states having a positive home pregnancy test last week. Reports having period like cramps with light spotting 2 days ago. She states the bleeding has resolved but has intermittent cramping. She has not seen an OBGYN during this pregnancy. Pt is requesting to have STD testing done today but denies having any vaginal discharge or urinary symptoms.

## 2021-12-19 NOTE — ED PROVIDER NOTE - NSFOLLOWUPINSTRUCTIONS_ED_ALL_ED_FT
Your ultrasound confirmed a pregnancy within the uterus.    Please take tylenol 650mg up to four times daily for pain. Moist heat may provide additional relief. Stay well hydrated.    Follow up with our [facility] at [address] [place] [address]. Please call [phone] for an appointment.    You will be contacted regarding the results of your gonorrhea and chlamydia testing in 24-48 hours.    Return to the Emergency Department if you develop worsening abdominal pain, vaginal bleeding, or any other concerns.

## 2021-12-19 NOTE — ED ADULT NURSE NOTE - OBJECTIVE STATEMENT
Pt is a 32 y/o female, , approximately 8 weeks pregnant, A&Ox4 in NAD ambulatory with steady gait c/o abdominal discomfort and vaginal spotting. Pt denies urinary symptoms.

## 2021-12-19 NOTE — ED PROVIDER NOTE - ATTENDING CONTRIBUTION TO CARE
Vitals wnl, exam as above.  Hgb 10.4, rh+, hcg [id number], US showing "Single viable intrauterine pregnancy. Estimated gestational age of 9 weeks 0 days Estimated due date of  July 24, 2022"  Discussed importance of outpt follow up and return precautions. Clinically no indication for further emergent ED workup or hospitalization at this time. Comfortable for dc, outpt f/u.

## 2021-12-19 NOTE — ED PROVIDER NOTE - CLINICAL SUMMARY MEDICAL DECISION MAKING FREE TEXT BOX
Pt afebrile and hemodynamically stable. Her abdomen is non-tender. She denies vaginal bleeding. Labs unremarkable. HCG [id number]. She is Rh pos. US pelvis with single viable IUP. Discussed all results with pt. She has OB/GYN and will provide Women's Health follow up. Return precautions given.

## 2021-12-19 NOTE — ED PROVIDER NOTE - NS ED ROS FT
Constitutional: No fever. No chills.  Eyes: No redness. No discharge. No vision change.   ENT: No sore throat. No ear pain.  Cardiovascular: No chest pain. No leg swelling.  Respiratory: No cough. No shortness of breath.  GI: No vomiting. No diarrhea.   : lower abdominal cramping, period like cramps with light spotting, reports bleeding, no vaginal discharge or urinary symptoms.   MSK: No joint pain. No back pain.   Skin: No rash. No abrasions.   Neuro: No numbness. No weakness.   Psych: No known mental health issues.

## 2021-12-19 NOTE — ED ADULT NURSE NOTE - NSICDXPASTMEDICALHX_GEN_ALL_CORE_FT
Anesthesia ROS/Med Hx        GI/HEPATIC/RENAL Review:    Pt. positive for GERD    Anesthesia Plan  ASA Status: 2  Anesthesia Type: L&D Epidural  Reviewed: Lab Results, Nursing Notes, Medications, Past Med History, Problem List, Allergies and Patient Summary  The proposed anesthetic plan, including its risks and benefits, have been discussed with the Patient - along with the risks and benefits of alternatives.  Questions were encouraged and answered and the patient and/or representative agrees to proceed.  Plan Comments: R/B of combined spinal-epidural/epidural/spinal for labor/ analgesia discussed with patient including but not limited to post-dural puncture headache, back pain, neurologic complications, pruritis, possible difficult epidural catheter placement, and potential for inadequate pain relief. Questions answered and patient wishes to proceed.       Physical Exam  Mallampati: I  TM Distance: >3 FB  Neck ROM: Full  cardiovascular exam normal  pulmonary exam normal  Patient Demonstrates:  Gravid  abdominal exam normal  dental exam normal      
PAST MEDICAL HISTORY:  Anemia     Scoliosis

## 2021-12-19 NOTE — ED PROVIDER NOTE - PATIENT PORTAL LINK FT
You can access the FollowMyHealth Patient Portal offered by [facility] by registering at the following website: [email or web address]. By joining [employer]’s FollowMyHealth portal, you will also be able to view your health information using other applications (apps) compatible with our system.

## 2022-02-04 ENCOUNTER — NON-APPOINTMENT (OUTPATIENT)
Age: 32
End: 2022-02-04

## 2022-02-17 NOTE — ED ADULT NURSE NOTE - FINAL NURSING ELECTRONIC SIGNATURE
Consultation - Cardiology   Jaime Osullivan [de-identified] y o  male MRN: [id number]    Encounter: [id number]    Assessment/Plan     Assessment:     Dyspnea,   HTN  Dyslipidemia    Plan:    Dyspnea: he has stable symptoms  We will check an echocardiogram for further evaluation  We did discuss he could have a component of congestive heart failure secondary to hypertension  Hypertension: He has resistant htn  He is having difficulty taking hydralazine QID We will try hydralazine 75 mg TID and see how it goes  Continue with the remainder of med rx  Dyslipidemia: Continue with simvastatin  His LDL is 51  No changes  History of Present Illness   Physician Requesting Consult: No att  providers found  Reason for Consult / Principal Problem: Hypertension, dyspnea  HPI: Jaime Osullivan is a [de-identified]y o  year old male who presents with a history of Diabetes although currently not on medical therapy, Hypertension and Dyslipidemia  He has resistant hypertension and on multiple medications  Cr is 2 1  He does have alzheimers dementia  His daughter is present to help with history  [employer] does have dyspnea with mild exertion  He has no chest pain  He denies LE edema, orthopnea or PND  Review of Systems   Constitutional: Negative  HENT: Negative  Eyes: Negative  Cardiovascular: Positive for dyspnea on exertion  Respiratory: Positive for shortness of breath  Endocrine: Negative  Hematologic/Lymphatic: Negative  Skin: Negative  Musculoskeletal: Negative  Gastrointestinal: Negative  Genitourinary: Negative  Neurological: Negative  Psychiatric/Behavioral: Negative  Allergic/Immunologic: Negative          Historical Information   Past Medical History:   Diagnosis Date    Anemia     BPH (benign prostatic hyperplasia)     Chronic kidney disease     [facility] [address] [facility]     [facility]     Hyperlipidemia     Hypertension     Incontinence     Osteoarthritis      Past Surgical History: Procedure Laterality Date    COLON SURGERY      COLONOSCOPY      FL RETROGRADE PYELOGRAM  12/13/2019    SC CYSTOURETHRO W/IMPLANT N/A 12/13/2019    Procedure: CYSTOSCOPY WITH INSERTION UROLIFT, BILATERAL RETROGRADE PYELOGRAM;  Surgeon: Leif Griffin MD;  Location: [facility];  Service: Urology    TUMOR REMOVAL         Social History:  Social History     Substance and Sexual Activity   Alcohol Use Never     Social History     Substance and Sexual Activity   Drug Use Never     Social History     Tobacco Use   Smoking Status Passive Smoke Exposure - Never Smoker   Smokeless Tobacco Never Used       Family History:   Family History   Problem Relation Age of Onset    Heart failure Brother     Dementia Maternal Uncle        Meds/Allergies   Allergies   Allergen Reactions    Strawberry C [Ascorbate - Food Allergy] Hives       Current Outpatient Medications:     acetaminophen (TYLENOL) 500 mg tablet, Take 500 mg by mouth every 6 (six) hours as needed for mild pain  , Disp: , Rfl:     allopurinol (ZYLOPRIM) 300 mg tablet, take 1 tablet by mouth once daily, Disp: 90 tablet, Rfl: 2    amLODIPine (NORVASC) 10 mg tablet, take 1 tablet by mouth daily, Disp: 90 tablet, Rfl: 2    B-D UF III MINI PEN NEEDLES 31G X 5 MM MISC, use 1 PEN NEEDLE to inject MEDICATION subcutaneously four times a day, Disp: , Rfl:     Diclofenac Sodium (VOLTAREN) 1 %, Apply 2 g topically 4 (four) times a day, Disp: 1 Tube, Rfl: 2    finasteride (PROSCAR) 5 mg tablet, take 1 tablet by mouth once daily, Disp: 90 tablet, Rfl: 3    fluticasone (FLONASE) 50 mcg/act nasal spray, 1 spray into each nostril daily, Disp: 1 Bottle, Rfl: 3    hydrALAZINE (APRESOLINE) 50 mg tablet, take 1 tablet by mouth four times a day, Disp: 360 tablet, Rfl: 2    Incontinence Supply Disposable (Incontinence Brief Large) MISC, by Does not apply route 4 (four) times a day PLEASE SUPPLY EXTRA-LARGE , Disp: 120 each, Rfl: 5    labetalol (NORMODYNE) 200 mg tablet, take 1 tablet by mouth once daily at bedtime, Disp: 90 tablet, Rfl: 3    Multiple Vitamins-Minerals (MULTIVITAMIN WITH MINERALS) tablet, Take 1 tablet by mouth daily, Disp: , Rfl:     omega-3-acid ethyl esters (LOVAZA) 1 g capsule, Take 1 g by mouth daily, Disp: , Rfl:     pantoprazole (PROTONIX) 40 mg tablet, Take 1 tablet (40 mg total) by mouth daily (Patient taking differently: Take 40 mg by mouth daily As needed ), Disp: 60 tablet, Rfl: 1    simvastatin (ZOCOR) 20 mg tablet, take 1 tablet by mouth at bedtime, Disp: 90 tablet, Rfl: 2    Contour Next Test test strip, use to TEST BLOOD SUGAR four times a day (Patient not taking: Reported on 12/28/2021), Disp: 150 strip, Rfl: 3    furosemide (LASIX) 40 mg tablet, Take 1 tablet (40 mg total) by mouth daily (Patient not taking: Reported on 8/16/2021), Disp: 90 tablet, Rfl: 3    Lancet Devices (Microlet Next Lancing Device) MISC, Use to check blood sugar daily (Patient not taking: Reported on 12/28/2021 ), Disp: 1 each, Rfl: 0    Microlet Lancets MISC, use to TEST BLOOD SUGAR four times a day (Patient not taking: Reported on 12/28/2021), Disp: 150 each, Rfl: 3    Vitals:   Pulse: 82  Blood Pressue: 128/64  Weight: 100 kg (221 lb)      Physical Exam  Constitutional:       General: He is not in acute distress  Appearance: He is well-developed  He is not diaphoretic  HENT:      Head: Normocephalic  Eyes:      General: No scleral icterus  Right eye: No discharge  Conjunctiva/sclera: Conjunctivae normal    Neck:      Vascular: No JVD  Cardiovascular:      Rate and Rhythm: Normal rate and regular rhythm  Heart sounds: No murmur heard  No friction rub  No gallop  Pulmonary:      Effort: Pulmonary effort is normal  No respiratory distress  Breath sounds: Normal breath sounds  No wheezing or rales  Abdominal:      General: Bowel sounds are normal  There is no distension  Palpations: Abdomen is soft        Tenderness: There is no abdominal tenderness  There is no rebound  Musculoskeletal:         General: No tenderness or deformity  Cervical back: Normal range of motion  Skin:     General: Skin is warm and dry  Neurological:      Mental Status: He is alert and oriented to person, place, and time  [unfilled]    Invasive Devices  Report    None                 Lab Results   Component Value Date     07/23/2021    CO2 23 07/23/2021    BUN 24 07/23/2021    CREATININE 2 12 (H) 07/23/2021    EGFR 33 07/23/2021    CALCIUM 9 1 07/23/2021    AST 17 05/09/2021    ALT 29 05/09/2021    ALKPHOS 114 05/09/2021     Lab Results   Component Value Date    WBC 6 37 07/23/2021    HGB 13 7 07/23/2021    PLT  07/23/2021      Comment:      Not reported due to platelet clumping  No components found for: TROP    Imaging:     EKG: NSR Normal ECG     Counseling / Coordination of Care  Total floor / unit time spent today 45 minutes  Greater than 50% of total time was spent with the patient and / or family counseling and / or coordination of care  A description of the counseling / coordination of care  30-Apr-2021 08:11

## 2022-04-21 ENCOUNTER — APPOINTMENT (OUTPATIENT)
Dept: BARIATRICS | Facility: CLINIC | Age: 32
End: 2022-04-21

## 2022-05-10 ENCOUNTER — APPOINTMENT (OUTPATIENT)
Dept: RADIOLOGY | Facility: CLINIC | Age: 32
End: 2022-05-10

## 2022-06-15 ENCOUNTER — APPOINTMENT (OUTPATIENT)
Dept: BARIATRICS | Facility: CLINIC | Age: 32
End: 2022-06-15

## 2022-06-30 NOTE — ED ADULT NURSE NOTE - NURSING MUSC STRENGTH
decreased po intake, abnormal labs
hand grasp, leg strength strong and equal bilaterally

## 2022-07-07 ENCOUNTER — APPOINTMENT (OUTPATIENT)
Dept: BARIATRICS | Facility: CLINIC | Age: 32
End: 2022-07-07

## 2022-07-28 ENCOUNTER — APPOINTMENT (OUTPATIENT)
Dept: BARIATRICS | Facility: CLINIC | Age: 32
End: 2022-07-28

## 2022-09-15 ENCOUNTER — APPOINTMENT (OUTPATIENT)
Dept: BARIATRICS | Facility: CLINIC | Age: 32
End: 2022-09-15

## 2022-09-15 VITALS
OXYGEN SATURATION: 100 % | WEIGHT: 251.38 LBS | DIASTOLIC BLOOD PRESSURE: 83 MMHG | HEART RATE: 67 BPM | SYSTOLIC BLOOD PRESSURE: 141 MMHG | BODY MASS INDEX: 40.4 KG/M2 | TEMPERATURE: 96.3 F | HEIGHT: 66 IN

## 2022-09-15 DIAGNOSIS — Z00.00 ENCOUNTER FOR GENERAL ADULT MEDICAL EXAMINATION W/OUT ABNORMAL FINDINGS: ICD-10-CM

## 2022-09-15 PROCEDURE — ZZZZZ: CPT

## 2022-09-15 PROCEDURE — 99204 OFFICE O/P NEW MOD 45 MIN: CPT

## 2022-09-15 PROCEDURE — 99203 OFFICE O/P NEW LOW 30 MIN: CPT

## 2022-09-26 ENCOUNTER — APPOINTMENT (OUTPATIENT)
Dept: PULMONOLOGY | Facility: CLINIC | Age: 32
End: 2022-09-26

## 2022-09-28 NOTE — PLAN
[FreeTextEntry1] : Will begin bariatric workup including sleep study to assess obesity-related disordered breathing.\par

## 2022-09-28 NOTE — ADDENDUM
[FreeTextEntry1] : Documented by Geena Martinez acting as a scribe for WES Fajardo on 09/15/2022\par

## 2022-09-28 NOTE — HISTORY OF PRESENT ILLNESS
[de-identified] : Patient is a 32 year old F with a PMHx obesity(BMI 40), asthma and scoliosis and no significant PSHx. Seen  previously. Denies acid reflux. Has started the bariatric workup and will discuss with the coordinators about the necessary procedure and clearances. She is interested in VSG. \par \par We discussed at length surgical and non-surgical options and that non surgical approaches are unlikely to lead to long term, sustained weight loss. We also discussed that surgery alone is unlikely to be successful but should rather be seen as a tool for weight loss to be integrated with physical activity and nutritional counseling. The patient verbalized understanding and agrees to proceed with the evaluation. All risks of surgery were explained to the patient including the risks of leaks, infections, blood clots and death.\par \par

## 2022-09-28 NOTE — ASSESSMENT
[FreeTextEntry1] : Patient is a 32 year old F with a PMHx obesity(BMI 40), asthma and scoliosis and no significant PSHx. Seen  previously.  Discussed with the patient all the options of bariatric surgery and its risks and benefits. Patient is familiar with the bariatric workup and pre and post op procedure. Will speak to the coordinators and continue the bariatric workup.

## 2022-09-28 NOTE — END OF VISIT
[FreeTextEntry3] : All medical record entries made by the Scribe were at my, WES Fajardo , direction and personally dictated by me on 09/15/2022 . I have reviewed the chart and agree that the record accurately reflects my personal performance of the history, physical exam, assessment and plan. I have also personally directed, reviewed, and agreed with the chart.\par  [Time Spent: ___ minutes] : I have spent [unfilled] minutes of time on the encounter.

## 2022-10-10 ENCOUNTER — APPOINTMENT (OUTPATIENT)
Dept: BARIATRICS | Facility: CLINIC | Age: 32
End: 2022-10-10

## 2022-10-10 PROCEDURE — 97803 MED NUTRITION INDIV SUBSEQ: CPT | Mod: 95

## 2022-10-11 ENCOUNTER — APPOINTMENT (OUTPATIENT)
Dept: RADIOLOGY | Facility: HOSPITAL | Age: 32
End: 2022-10-11

## 2022-10-17 ENCOUNTER — APPOINTMENT (OUTPATIENT)
Dept: PULMONOLOGY | Facility: CLINIC | Age: 32
End: 2022-10-17

## 2022-11-10 ENCOUNTER — APPOINTMENT (OUTPATIENT)
Dept: BARIATRICS | Facility: CLINIC | Age: 32
End: 2022-11-10

## 2022-11-10 ENCOUNTER — TRANSCRIPTION ENCOUNTER (OUTPATIENT)
Age: 32
End: 2022-11-10

## 2022-11-10 PROCEDURE — 98968 PH1 ASSMT&MGMT NQHP 21-30: CPT

## 2022-11-26 ENCOUNTER — EMERGENCY (EMERGENCY)
Facility: HOSPITAL | Age: 32
LOS: 1 days | Discharge: HOME | End: 2022-11-26
Admitting: EMERGENCY MEDICINE
Payer: MEDICAID

## 2022-11-26 VITALS
OXYGEN SATURATION: 98 % | RESPIRATION RATE: 18 BRPM | HEART RATE: 67 BPM | WEIGHT: 240.08 LBS | DIASTOLIC BLOOD PRESSURE: 81 MMHG | HEIGHT: 66 IN | SYSTOLIC BLOOD PRESSURE: 118 MMHG | TEMPERATURE: 98 F

## 2022-11-26 LAB — HIV 1+2 AB+HIV1 P24 AG SERPL QL IA: SIGNIFICANT CHANGE UP

## 2022-11-26 PROCEDURE — 99284 EMERGENCY DEPT VISIT MOD MDM: CPT

## 2022-11-26 PROCEDURE — 99283 EMERGENCY DEPT VISIT LOW MDM: CPT

## 2022-11-26 PROCEDURE — 87389 HIV-1 AG W/HIV-1&-2 AB AG IA: CPT

## 2022-11-26 PROCEDURE — 36415 COLL VENOUS BLD VENIPUNCTURE: CPT

## 2022-11-26 RX ORDER — AMOXICILLIN 250 MG/5ML
1 SUSPENSION, RECONSTITUTED, ORAL (ML) ORAL
Qty: 21 | Refills: 0
Start: 2022-11-26 | End: 2022-12-02

## 2022-11-26 RX ORDER — IBUPROFEN 200 MG
600 TABLET ORAL ONCE
Refills: 0 | Status: COMPLETED | OUTPATIENT
Start: 2022-11-26 | End: 2022-11-26

## 2022-11-26 RX ORDER — IBUPROFEN 200 MG
1 TABLET ORAL
Qty: 15 | Refills: 0
Start: 2022-11-26 | End: 2022-11-30

## 2022-11-26 RX ADMIN — Medication 600 MILLIGRAM(S): at 09:32

## 2022-11-26 RX ADMIN — Medication 600 MILLIGRAM(S): at 09:23

## 2022-11-26 RX ADMIN — Medication 1 TABLET(S): at 09:23

## 2022-11-26 NOTE — ED PROVIDER NOTE - PATIENT PORTAL LINK FT
You can access the FollowMyHealth Patient Portal offered by [facility] by registering at the following website: [email or web address]. By joining [employer]’s FollowMyHealth portal, you will also be able to view your health information using other applications (apps) compatible with our system.

## 2022-11-26 NOTE — ED ADULT NURSE NOTE - OBJECTIVE STATEMENT
.  32years female alert mental state (AOX3) received on foot.  -complain of toothache.  pt has toothache.   -denied chest pain, SOB, fever, coughing, sore throat.  Pt is in the chair comfortably at this time. Will continue to monitor and document any changes. .  32years female alert mental state (AOX3) received on foot.  -complain of toothache.  pt has on/off toothache. toothache started today morning.  -assess:  redness and tenderness of gum near 17th teeth.  -denied chest pain, SOB, fever, coughing, sore throat.  Pt is in the chair comfortably at this time. Will continue to monitor and document any changes.

## 2022-11-26 NOTE — ED PROVIDER NOTE - ENMT, MLM
Airway patent, Nasal mucosa clear. Mouth with normal mucosa, + fractured, impacted L lower molar, no gingival swelling or tend, no facial swelling. Throat has no vesicles, no oropharyngeal exudates and uvula is midline. no cervical lymphadenopathy b/l

## 2022-11-26 NOTE — ED PROVIDER NOTE - CLINICAL SUMMARY MEDICAL DECISION MAKING FREE TEXT BOX
pt c/o l lower toothache x few mon, feels like it's now aggravated, has not taken any pain meds, has a dentist but has not seen them, no abscess/inf on exam - will prophylax w/abx and dc w/pain meds, dental clinic f/u list given, soft diet advised, pt understands and agrees w/plan, strict return precautions given

## 2022-11-26 NOTE — ED ADULT NURSE NOTE - NSIMPLEMENTINTERV_GEN_ALL_ED
Implemented All Universal Safety Interventions:  [place] to call system. Call bell, personal items and telephone within reach. Instruct patient to call for assistance. Room bathroom lighting operational. Non-slip footwear when patient is off stretcher. Physically safe environment: no spills, clutter or unnecessary equipment. Stretcher in lowest position, wheels locked, appropriate side rails in place.

## 2022-11-26 NOTE — ED PROVIDER NOTE - OBJECTIVE STATEMENT
The pt is a 33 y/o F, who presents to ED c/o L lower toothache x few mon. pt states that feels like the gum is now irritated, has a dentist but has not seen them, has not taken any pain meds. Also requesting hiv test. Denies swelling, pus, bleeding, jaw pain, fevers, chills.

## 2022-11-27 DIAGNOSIS — K08.89 OTHER SPECIFIED DISORDERS OF TEETH AND SUPPORTING STRUCTURES: ICD-10-CM

## 2022-11-27 DIAGNOSIS — Z91.013 ALLERGY TO SEAFOOD: ICD-10-CM

## 2022-11-27 DIAGNOSIS — Z20.2 CONTACT WITH AND (SUSPECTED) EXPOSURE TO INFECTIONS WITH A PREDOMINANTLY SEXUAL MODE OF TRANSMISSION: ICD-10-CM

## 2023-01-15 ENCOUNTER — EMERGENCY (EMERGENCY)
Facility: HOSPITAL | Age: 33
LOS: 1 days | Discharge: HOME | End: 2023-01-15
Admitting: EMERGENCY MEDICINE
Payer: MEDICAID

## 2023-01-15 VITALS
HEIGHT: 66 IN | TEMPERATURE: 98 F | DIASTOLIC BLOOD PRESSURE: 81 MMHG | OXYGEN SATURATION: 97 % | RESPIRATION RATE: 19 BRPM | HEART RATE: 82 BPM | SYSTOLIC BLOOD PRESSURE: 120 MMHG

## 2023-01-15 DIAGNOSIS — Z91.013 ALLERGY TO SEAFOOD: ICD-10-CM

## 2023-01-15 DIAGNOSIS — R10.2 PELVIC AND PERINEAL PAIN: ICD-10-CM

## 2023-01-15 DIAGNOSIS — M41.9 SCOLIOSIS, UNSPECIFIED: ICD-10-CM

## 2023-01-15 DIAGNOSIS — N93.9 ABNORMAL UTERINE AND VAGINAL BLEEDING, UNSPECIFIED: ICD-10-CM

## 2023-01-15 DIAGNOSIS — Z86.2 PERSONAL HISTORY OF DISEASES OF THE BLOOD AND BLOOD-FORMING ORGANS AND CERTAIN DISORDERS INVOLVING THE IMMUNE MECHANISM: ICD-10-CM

## 2023-01-15 LAB
ALBUMIN SERPL ELPH-MCNC: 4.1 G/DL — SIGNIFICANT CHANGE UP (ref 3.3–5)
ALP SERPL-CCNC: 93 U/L — SIGNIFICANT CHANGE UP (ref 40–120)
ALT FLD-CCNC: 9 U/L — LOW (ref 10–45)
ANION GAP SERPL CALC-SCNC: 10 MMOL/L — SIGNIFICANT CHANGE UP (ref 5–17)
ANISOCYTOSIS BLD QL: SLIGHT — SIGNIFICANT CHANGE UP
APTT BLD: 31.7 SEC — SIGNIFICANT CHANGE UP (ref 27.5–35.5)
AST SERPL-CCNC: 12 U/L — SIGNIFICANT CHANGE UP (ref 10–40)
BASOPHILS # BLD AUTO: 0 K/UL — SIGNIFICANT CHANGE UP (ref 0–0.2)
BASOPHILS NFR BLD AUTO: 0 % — SIGNIFICANT CHANGE UP (ref 0–2)
BILIRUB SERPL-MCNC: 0.2 MG/DL — SIGNIFICANT CHANGE UP (ref 0.2–1.2)
BLD GP AB SCN SERPL QL: NEGATIVE — SIGNIFICANT CHANGE UP
BUN SERPL-MCNC: 8 MG/DL — SIGNIFICANT CHANGE UP (ref 7–23)
BURR CELLS BLD QL SMEAR: PRESENT — SIGNIFICANT CHANGE UP
CALCIUM SERPL-MCNC: 8.9 MG/DL — SIGNIFICANT CHANGE UP (ref 8.4–10.5)
CHLORIDE SERPL-SCNC: 104 MMOL/L — SIGNIFICANT CHANGE UP (ref 96–108)
CO2 SERPL-SCNC: 27 MMOL/L — SIGNIFICANT CHANGE UP (ref 22–31)
CREAT SERPL-MCNC: 0.73 MG/DL — SIGNIFICANT CHANGE UP (ref 0.5–1.3)
DACRYOCYTES BLD QL SMEAR: SLIGHT — SIGNIFICANT CHANGE UP
EGFR: 111 ML/MIN/1.73M2 — SIGNIFICANT CHANGE UP
ELLIPTOCYTES BLD QL SMEAR: SIGNIFICANT CHANGE UP
EOSINOPHIL # BLD AUTO: 0.07 K/UL — SIGNIFICANT CHANGE UP (ref 0–0.5)
EOSINOPHIL NFR BLD AUTO: 0.9 % — SIGNIFICANT CHANGE UP (ref 0–6)
GIANT PLATELETS BLD QL SMEAR: PRESENT — SIGNIFICANT CHANGE UP
GLUCOSE SERPL-MCNC: 141 MG/DL — HIGH (ref 70–99)
HCG SERPL-ACNC: <0 MIU/ML — SIGNIFICANT CHANGE UP
HCT VFR BLD CALC: 28.2 % — LOW (ref 34.5–45)
HGB BLD-MCNC: 8.2 G/DL — LOW (ref 11.5–15.5)
HYPOCHROMIA BLD QL: SIGNIFICANT CHANGE UP
INR BLD: 1.11 — SIGNIFICANT CHANGE UP (ref 0.88–1.16)
LYMPHOCYTES # BLD AUTO: 2.34 K/UL — SIGNIFICANT CHANGE UP (ref 1–3.3)
LYMPHOCYTES # BLD AUTO: 28.9 % — SIGNIFICANT CHANGE UP (ref 13–44)
MANUAL SMEAR VERIFICATION: SIGNIFICANT CHANGE UP
MCHC RBC-ENTMCNC: 19.1 PG — LOW (ref 27–34)
MCHC RBC-ENTMCNC: 29.1 GM/DL — LOW (ref 32–36)
MCV RBC AUTO: 65.7 FL — LOW (ref 80–100)
MICROCYTES BLD QL: SIGNIFICANT CHANGE UP
MONOCYTES # BLD AUTO: 0.21 K/UL — SIGNIFICANT CHANGE UP (ref 0–0.9)
MONOCYTES NFR BLD AUTO: 2.6 % — SIGNIFICANT CHANGE UP (ref 2–14)
NEUTROPHILS # BLD AUTO: 5.41 K/UL — SIGNIFICANT CHANGE UP (ref 1.8–7.4)
NEUTROPHILS NFR BLD AUTO: 66.7 % — SIGNIFICANT CHANGE UP (ref 43–77)
OVALOCYTES BLD QL SMEAR: SIGNIFICANT CHANGE UP
PLAT MORPH BLD: NORMAL — SIGNIFICANT CHANGE UP
PLATELET # BLD AUTO: 411 K/UL — HIGH (ref 150–400)
POIKILOCYTOSIS BLD QL AUTO: SIGNIFICANT CHANGE UP
POLYCHROMASIA BLD QL SMEAR: SLIGHT — SIGNIFICANT CHANGE UP
POTASSIUM SERPL-MCNC: 3.8 MMOL/L — SIGNIFICANT CHANGE UP (ref 3.5–5.3)
POTASSIUM SERPL-SCNC: 3.8 MMOL/L — SIGNIFICANT CHANGE UP (ref 3.5–5.3)
PROT SERPL-MCNC: 7.5 G/DL — SIGNIFICANT CHANGE UP (ref 6–8.3)
PROTHROM AB SERPL-ACNC: 13.2 SEC — SIGNIFICANT CHANGE UP (ref 10.5–13.4)
RBC # BLD: 4.29 M/UL — SIGNIFICANT CHANGE UP (ref 3.8–5.2)
RBC # FLD: 17.5 % — HIGH (ref 10.3–14.5)
RBC BLD AUTO: ABNORMAL
RH IG SCN BLD-IMP: POSITIVE — SIGNIFICANT CHANGE UP
SCHISTOCYTES BLD QL AUTO: SLIGHT — SIGNIFICANT CHANGE UP
SODIUM SERPL-SCNC: 141 MMOL/L — SIGNIFICANT CHANGE UP (ref 135–145)
VARIANT LYMPHS # BLD: 0.9 % — SIGNIFICANT CHANGE UP (ref 0–6)
WBC # BLD: 8.11 K/UL — SIGNIFICANT CHANGE UP (ref 3.8–10.5)
WBC # FLD AUTO: 8.11 K/UL — SIGNIFICANT CHANGE UP (ref 3.8–10.5)

## 2023-01-15 PROCEDURE — 96374 THER/PROPH/DIAG INJ IV PUSH: CPT

## 2023-01-15 PROCEDURE — 99284 EMERGENCY DEPT VISIT MOD MDM: CPT

## 2023-01-15 PROCEDURE — 76830 TRANSVAGINAL US NON-OB: CPT | Mod: 26

## 2023-01-15 PROCEDURE — 85730 THROMBOPLASTIN TIME PARTIAL: CPT

## 2023-01-15 PROCEDURE — 84702 CHORIONIC GONADOTROPIN TEST: CPT

## 2023-01-15 PROCEDURE — 76830 TRANSVAGINAL US NON-OB: CPT

## 2023-01-15 PROCEDURE — 86850 RBC ANTIBODY SCREEN: CPT

## 2023-01-15 PROCEDURE — 86901 BLOOD TYPING SEROLOGIC RH(D): CPT

## 2023-01-15 PROCEDURE — 99284 EMERGENCY DEPT VISIT MOD MDM: CPT | Mod: 25

## 2023-01-15 PROCEDURE — 80053 COMPREHEN METABOLIC PANEL: CPT

## 2023-01-15 PROCEDURE — 86900 BLOOD TYPING SEROLOGIC ABO: CPT

## 2023-01-15 PROCEDURE — 76856 US EXAM PELVIC COMPLETE: CPT

## 2023-01-15 PROCEDURE — 76856 US EXAM PELVIC COMPLETE: CPT | Mod: 26

## 2023-01-15 PROCEDURE — 85025 COMPLETE CBC W/AUTO DIFF WBC: CPT

## 2023-01-15 PROCEDURE — 36415 COLL VENOUS BLD VENIPUNCTURE: CPT

## 2023-01-15 PROCEDURE — 85610 PROTHROMBIN TIME: CPT

## 2023-01-15 RX ORDER — KETOROLAC TROMETHAMINE 30 MG/ML
15 SYRINGE (ML) INJECTION ONCE
Refills: 0 | Status: DISCONTINUED | OUTPATIENT
Start: 2023-01-15 | End: 2023-01-15

## 2023-01-15 RX ADMIN — Medication 15 MILLIGRAM(S): at 05:20

## 2023-01-15 NOTE — ED PROVIDER NOTE - NSFOLLOWUPINSTRUCTIONS_ED_ALL_ED_FT
You were in the Emergency Department for vaginal bleeding and abdominal cramping.  Your work-up was reassuring to rule out an acute medical emergency.     Your blood counts were slightly low HEMOGLOBIN 8.2 / HEMATOCRIT 28.2  Your Ultrasound showed some thickening of the endometrium which could be ADENOMYOSIS. while not acutely life threatening it could be related to your cramping and bleeding.     After your workup today, we have determined it is safe for you to go home and see GYN as an outpatient for further workup and management. Follow up with GYNECOLOGY within one week.    RETURN PRECAUTIONS - Return to the Emergency Department for persistent, worsening, or new symptoms including worsening abdominal pain not controlled by medication suggested, vaginal bleeding more than 4 pads soaked per hour, fever > 102, blood in urine, or any other serious concerns. Return to this emergency department if you notice any other spontaneous bleeding (excessive bruising or wounds that wont stop bleeding, large amounts of blood in urine with clots or inability to urinate, blood in stool or dark tarry stools, if you have excessive nausea and vomiting and cannot keep down food/liquid or notice any blood in your vomit, fever > 102 not controlled by over the counter medication, if you pass out or lose consciousness, have palpitations, chest pain, or shortness of breath, syncope, or if you have any other serious concerns.

## 2023-01-15 NOTE — ED ADULT NURSE NOTE - OBJECTIVE STATEMENT
Pt presents to ER c/o vaginal bleeding with intermittent dizziness and lower abdominal "cramping" for ~1 week. Pt denies any other complaints at this time. Patient

## 2023-01-15 NOTE — ED PROVIDER NOTE - PHYSICAL EXAMINATION
Constitutional : Well appearing, non-toxic, no acute distress. awake, alert, oriented to person, place, time/situation.  Head : head normocephalic, atraumatic  EENMT : eyes clear bilaterally, PERRL, EOMI. airway patent. moist mucous membranes. neck supple.  Cardiac : Normal rate, regular rhythm. No murmur appreciated, no LE edema.  Resp : Breath sounds clear and equal bilaterally. Respirations even and unlabored.   Gastro : abdomen soft, nontender, nondistended. no rebound or guarding. no CVAT.  Pelvic : Normal external female genitalia. Normal vaginal mucosa, no lesions. small amount of dark blood in vault. no active bleeding visualized. Cervical os closed, no CMT. No adnexal tenderness   MSK :  range of motion is not limited, no muscle or joint tenderness  Back : No evidence of trauma. No spinal or CVA tenderness.  Vasc : Extremities warm and well perfused. 2+ radial and DP pulses. cap refill <2 seconds  Neuro : Alert and oriented, CNII-XII grossly intact, no focal deficits, no motor or sensory deficits.  Skin : Skin normal color for race, warm, dry and intact. No evidence of rash.  Psych : Alert and oriented to person, place, time/situation. normal mood and affect. no apparent risk to self or others.

## 2023-01-15 NOTE — ED PROVIDER NOTE - PROGRESS NOTE DETAILS
hgb 8.2 / hct 28.2 - similar to prior labs. 12/2021 - hgb 10.4 / hct 32.9. 4/2021 hgb 8.7 / hct 28.7.   US "IMPRESSION:  1. No acute pelvic pathology.  2. Uterine appearance suggestive of possible adenomyosis. Consider   further evaluation with MRI as clinically warranted on nonemergent basis."    work-up as above. no heavy bleeding or clots while in ED. pain improved w toradol in Ed.  has established gyn to follow up with. stable for dc at this time.     All results reviewed with the patient verbally. Discharge plan and return precautions d/w pt who verbalized understanding and agrees with plan. All questions answered. Vitals WNL. Ready for d/c.

## 2023-01-15 NOTE — ED PROVIDER NOTE - CLINICAL SUMMARY MEDICAL DECISION MAKING FREE TEXT BOX
pt w hx of anemia, scoliosis. here w 1.5 weeks heavy vaginal bleeding and lower abd cramping. no s/sx anemia.   pt well appearing, stable vitals - normotensive, not tachycardic, exam reassuring, abd exam benign, pelvic exam w small amount dark blood in vault, no active bleeding noted.   ddx DUB vs fibroids. less likely ovarian cyst vs torsion given ongoing pain no acute changes  assess for anemia  plan - labs, tvus, reassess

## 2023-01-15 NOTE — ED ADULT TRIAGE NOTE - CHIEF COMPLAINT QUOTE
"I have had heavy vaginal bleeding for more than a week now. It hurts down in my cervical area too. It feels like cramps. I have also been coughing"

## 2023-01-15 NOTE — ED PROVIDER NOTE - OBJECTIVE STATEMENT
32 yr old female, history of anemia, scoliosis, presents to the Emergency Department with vaginal bleeding. pt reports heavy bleeding x1.5 weeks. bleeding through 6-7 pads per day. 32 yr old female, history of anemia, scoliosis, presents to the Emergency Department with vaginal bleeding. pt reports heavy bleeding x1.5 weeks. bleeding through 6-7 pads per day with some clots. also w lower abdominal cramping, bilateral. bleeding and cramping more severe than normal menses. no urinary symptoms, flank pain, abnormal vaginal discharge, fever.   no chest pain, sob, palpitations, coreas, lightheadedness, near-syncope. hx anemia - required blood transfusion after giving birth (9yrs ago)  no hx of ovarian cysts or fibroids. last saw gyn 1 yr ago.

## 2023-01-15 NOTE — ED PROVIDER NOTE - PATIENT PORTAL LINK FT
You can access the FollowMyHealth Patient Portal offered by [facility] by registering at the following website: [email or web address]. By joining [employer]’s FollowMyHealth portal, you will also be able to view your health information using other applications (apps) compatible with our system.

## 2023-02-08 ENCOUNTER — OUTPATIENT (OUTPATIENT)
Dept: OUTPATIENT SERVICES | Facility: HOSPITAL | Age: 33
LOS: 1 days | End: 2023-02-08
Payer: MEDICAID

## 2023-02-08 ENCOUNTER — APPOINTMENT (OUTPATIENT)
Dept: RADIOLOGY | Facility: HOSPITAL | Age: 33
End: 2023-02-08
Payer: MEDICAID

## 2023-02-08 ENCOUNTER — APPOINTMENT (OUTPATIENT)
Dept: PULMONOLOGY | Facility: CLINIC | Age: 33
End: 2023-02-08
Payer: MEDICAID

## 2023-02-08 VITALS
RESPIRATION RATE: 12 BRPM | HEART RATE: 84 BPM | DIASTOLIC BLOOD PRESSURE: 80 MMHG | WEIGHT: 254 LBS | SYSTOLIC BLOOD PRESSURE: 124 MMHG | TEMPERATURE: 98.1 F | HEIGHT: 66 IN | OXYGEN SATURATION: 99 % | BODY MASS INDEX: 40.82 KG/M2

## 2023-02-08 DIAGNOSIS — E66.01 MORBID (SEVERE) OBESITY DUE TO EXCESS CALORIES: ICD-10-CM

## 2023-02-08 PROCEDURE — 74240 X-RAY XM UPR GI TRC 1CNTRST: CPT

## 2023-02-08 PROCEDURE — 99204 OFFICE O/P NEW MOD 45 MIN: CPT

## 2023-02-08 PROCEDURE — 74240 X-RAY XM UPR GI TRC 1CNTRST: CPT | Mod: 26

## 2023-02-13 PROBLEM — E66.01 SEVERE OBESITY (BMI >= 40): Status: ACTIVE | Noted: 2020-11-16

## 2023-02-13 NOTE — DISCUSSION/SUMMARY
[FreeTextEntry1] : 32 year old female, never smoker with obesity referred to pulmonary clinic by Dr. Martinez for pulmonary evaluation before bariatric surgery.\par \par Review:\par - Bariatric surgery note\par \par A/P\par Plan for CXR, PFT and 6 minute walk test and 6 minute walk test. Follow up after sleep study.

## 2023-02-13 NOTE — PHYSICAL EXAM
[No Acute Distress] : no acute distress [Well Nourished] : well nourished [Normal Oropharynx] : normal oropharynx [Normal Appearance] : normal appearance [No JVD] : no jvd [Normal Rate/Rhythm] : normal rate/rhythm [Normal S1, S2] : normal s1, s2 [No Resp Distress] : no resp distress [Clear to Auscultation Bilaterally] : clear to auscultation bilaterally [Benign] : benign [Not Tender] : not tender [No Clubbing] : no clubbing [No Cyanosis] : no cyanosis [Normal Color/ Pigmentation] : normal color/ pigmentation [No Rash] : no rash [No Focal Deficits] : no focal deficits [No Sensory Deficits] : no sensory deficits [Oriented x3] : oriented x3 [Normal Affect] : normal affect

## 2023-02-13 NOTE — HISTORY OF PRESENT ILLNESS
[Never] : never [TextBox_4] : 32 year old female, never smoker with obesity referred to pulmonary clinic by Dr. Martinez for pulmonary evaluation before bariatric surgery\par Patient denies cough, SOB, chest pain or pulmonary illness in last 1 year. Patient denies snoring, witnessed apnea, early morning headache or choking at night time or excessive daytime sleepiness. ESS score is 5.\par

## 2023-02-13 NOTE — REVIEW OF SYSTEMS
[Fever] : no fever [Chills] : no chills [Dry Eyes] : no dry eyes [Eye Irritation] : no eye irritation [Cough] : no cough [Sputum] : no sputum [Chest Discomfort] : no chest discomfort [Orthopnea] : no orthopnea [Hay Fever] : no hay fever [Nasal Discharge] : no nasal discharge [GERD] : no gerd [Diarrhea] : no diarrhea

## 2023-02-13 NOTE — CONSULT LETTER
[Dear  ___] : Dear  [unfilled], [Consult Letter:] : I had the pleasure of evaluating your patient, [unfilled]. [Please see my note below.] : Please see my note below. [Consult Closing:] : Thank you very much for allowing me to participate in the care of this patient.  If you have any questions, please do not hesitate to contact me. [FreeTextEntry3] : Sincerely\par \par Bran Rich MD [facility]\par , \[facility]\"" [employer]\par Associate , Pulmonary and Critical Care Fellowship\par Pulmonary and Critical Care\par [facility]\par Phone: [phone]\par

## 2023-02-27 ENCOUNTER — APPOINTMENT (OUTPATIENT)
Dept: PULMONOLOGY | Facility: CLINIC | Age: 33
End: 2023-02-27

## 2023-03-06 ENCOUNTER — APPOINTMENT (OUTPATIENT)
Dept: PULMONOLOGY | Facility: CLINIC | Age: 33
End: 2023-03-06
Payer: MEDICAID

## 2023-03-06 ENCOUNTER — LABORATORY RESULT (OUTPATIENT)
Age: 33
End: 2023-03-06

## 2023-03-06 VITALS
SYSTOLIC BLOOD PRESSURE: 125 MMHG | TEMPERATURE: 98.4 F | BODY MASS INDEX: 40.02 KG/M2 | WEIGHT: 249 LBS | HEART RATE: 70 BPM | HEIGHT: 66 IN | OXYGEN SATURATION: 100 % | DIASTOLIC BLOOD PRESSURE: 79 MMHG

## 2023-03-06 PROCEDURE — 94060 EVALUATION OF WHEEZING: CPT

## 2023-03-06 PROCEDURE — 94729 DIFFUSING CAPACITY: CPT

## 2023-03-06 PROCEDURE — 94726 PLETHYSMOGRAPHY LUNG VOLUMES: CPT

## 2023-03-06 PROCEDURE — 99214 OFFICE O/P EST MOD 30 MIN: CPT | Mod: 25

## 2023-03-06 PROCEDURE — 94618 PULMONARY STRESS TESTING: CPT

## 2023-03-06 NOTE — HISTORY OF PRESENT ILLNESS
[Never] : never [TextBox_4] : 32 year old female, never smoker with obesity referred to pulmonary clinic by Dr. Martinez for pulmonary evaluation before bariatric surgery\par Patient denies cough, SOB, chest pain or pulmonary illness in last 1 year. Patient denies snoring, witnessed apnea, early morning headache or choking at night time or excessive daytime sleepiness. ESS score is 5.\par \par 3/6/23:\par PFt was done. Sleep study is still pending.

## 2023-03-06 NOTE — DISCUSSION/SUMMARY
[FreeTextEntry1] : 32 year old female, never smoker with obesity referred to pulmonary clinic by Dr. Martinez for pulmonary evaluation before bariatric surgery.\par \par Review:\par - Bariatric surgery note\par - PFT (3 /23 ): FEV1 93 , FEV1/FVC 80, TLC 87, DLCO 69, Rev 5%\par - Six minute (3/23): 100% oxygen sat with ambulation on RA\par \par A/P\par PFT showed normal spirometry. Patient picked up home sleep study equipment today. Follow up after home sleep study results.

## 2023-03-07 ENCOUNTER — APPOINTMENT (OUTPATIENT)
Dept: SLEEP CENTER | Facility: HOME HEALTH | Age: 33
End: 2023-03-07
Payer: MEDICAID

## 2023-03-07 ENCOUNTER — OUTPATIENT (OUTPATIENT)
Dept: OUTPATIENT SERVICES | Facility: HOSPITAL | Age: 33
LOS: 1 days | End: 2023-03-07
Payer: MEDICAID

## 2023-03-07 DIAGNOSIS — G47.33 OBSTRUCTIVE SLEEP APNEA (ADULT) (PEDIATRIC): ICD-10-CM

## 2023-03-07 PROCEDURE — 95800 SLP STDY UNATTENDED: CPT | Mod: 26

## 2023-03-07 PROCEDURE — 95800 SLP STDY UNATTENDED: CPT

## 2023-03-14 ENCOUNTER — APPOINTMENT (OUTPATIENT)
Dept: PULMONOLOGY | Facility: CLINIC | Age: 33
End: 2023-03-14
Payer: MEDICAID

## 2023-03-14 DIAGNOSIS — Z01.811 ENCOUNTER FOR PREPROCEDURAL RESPIRATORY EXAMINATION: ICD-10-CM

## 2023-03-14 DIAGNOSIS — G47.33 OBSTRUCTIVE SLEEP APNEA (ADULT) (PEDIATRIC): ICD-10-CM

## 2023-03-14 PROCEDURE — 99442: CPT

## 2023-03-14 NOTE — DISCUSSION/SUMMARY
[FreeTextEntry1] : 32 year old female, never smoker with obesity referred to pulmonary clinic by Dr. Martinez for pulmonary evaluation before bariatric surgery.\par \par Review:\par - Bariatric surgery note\par - PFT (3 /23 ): FEV1 93 , FEV1/FVC 80, TLC 87, DLCO 69, Rev 5%\par - Six minute (3/23): 100% oxygen sat with ambulation on RA\par - Sleep study (3/23): AHI 6.3/hr (4%), 11.5 (3%)\par \par A/P\par (1) Pre-Op Pulmonary examination:\par Patient had good functional status. She denies any pulmonary symptoms. She denies history of previous pulmonary illness or admission to hospital for pulmonary illness. Pulmonary exam was normal with no hypoxia on ambulation. CXR did not reveal any acute parenchymal abnormality. Sleep study showed mild sleep apnea.\par \par Patient may undergo bariatric surgery. Her ARISCAT score 15. She has Intermediate risk (13.3%) of in-hospital post-op pulmonary complications (composite including respiratory failure, respiratory infection, pleural effusion, atelectasis, pneumothorax, bronchospasm, aspiration pneumonitis).\par Recommendation:\par - Avoid Opioids during surgery\par - Extubate in upright position\par - Would recommend to extubate once patient is awake\par - BIPAP may be required after extubation\par \par (2) Mild Sleep Apnea:\par No indication for CPAP. \par Advised weight loss

## 2023-03-14 NOTE — CONSULT LETTER
[Dear  ___] : Dear  [unfilled], [Consult Letter:] : I had the pleasure of evaluating your patient, [unfilled]. [Please see my note below.] : Please see my note below. [Consult Closing:] : Thank you very much for allowing me to participate in the care of this patient.  If you have any questions, please do not hesitate to contact me. [FreeTextEntry3] : Sincerely\par \par Bran Rich MD [facility]\par , [facility] [employer]\par Associate , Pulmonary and Critical Care Fellowship\par Pulmonary and Critical Care\par [facility]\par Phone: [phone]\par

## 2023-03-14 NOTE — HISTORY OF PRESENT ILLNESS
[Home] : at home, [unfilled] , at the time of the visit. [Verbal consent obtained from patient] : the patient, [unfilled] [Never] : never [TextBox_4] : 32 year old female, never smoker with obesity referred to pulmonary clinic by Dr. Martinez for pulmonary evaluation before bariatric surgery\par Patient denies cough, SOB, chest pain or pulmonary illness in last 1 year. Patient denies snoring, witnessed apnea, early morning headache or choking at night time or excessive daytime sleepiness. ESS score is 5.\par \par 3/6/23:\par PFT was done. Sleep study is still pending. \par \par 3/14/23:\par Sleep study was done. No new pulmonary symptoms.

## 2023-03-17 ENCOUNTER — NON-APPOINTMENT (OUTPATIENT)
Age: 33
End: 2023-03-17

## 2023-03-17 DIAGNOSIS — A04.8 OTHER SPECIFIED BACTERIAL INTESTINAL INFECTIONS: ICD-10-CM

## 2023-03-17 DIAGNOSIS — D50.9 IRON DEFICIENCY ANEMIA, UNSPECIFIED: ICD-10-CM

## 2023-03-17 LAB
25(OH)D3 SERPL-MCNC: 13.2 NG/ML
A-TOCOPHEROL VIT E SERPL-MCNC: 7.4 MG/L
ALBUMIN SERPL ELPH-MCNC: 4.2 G/DL
ALP BLD-CCNC: 96 U/L
ALT SERPL-CCNC: 6 U/L
ANION GAP SERPL CALC-SCNC: 12 MMOL/L
APPEARANCE: ABNORMAL
AST SERPL-CCNC: 12 U/L
BACTERIA: NEGATIVE
BASOPHILS # BLD AUTO: 0.03 K/UL
BASOPHILS NFR BLD AUTO: 0.4 %
BETA+GAMMA TOCOPHEROL SERPL-MCNC: 1.3 MG/L
BILIRUB SERPL-MCNC: 0.3 MG/DL
BILIRUBIN URINE: NEGATIVE
BLOOD URINE: NEGATIVE
BUN SERPL-MCNC: 8 MG/DL
CA-I SERPL-SCNC: 4.9 MG/DL
CALCIUM SERPL-MCNC: 9.1 MG/DL
CALCIUM SERPL-MCNC: 9.1 MG/DL
CHLORIDE SERPL-SCNC: 105 MMOL/L
CHOLEST SERPL-MCNC: 149 MG/DL
CO2 SERPL-SCNC: 24 MMOL/L
COLOR: YELLOW
CREAT SERPL-MCNC: 0.71 MG/DL
EGFR: 115 ML/MIN/1.73M2
EOSINOPHIL # BLD AUTO: 0.07 K/UL
EOSINOPHIL NFR BLD AUTO: 1 %
ESTIMATED AVERAGE GLUCOSE: 146 MG/DL
FOLATE SERPL-MCNC: 8.3 NG/ML
GLUCOSE QUALITATIVE U: NEGATIVE
GLUCOSE SERPL-MCNC: 117 MG/DL
HBA1C MFR BLD HPLC: 6.7 %
HCG SERPL QL: NEGATIVE
HCT VFR BLD CALC: 29.7 %
HDLC SERPL-MCNC: 34 MG/DL
HGB BLD-MCNC: 8.1 G/DL
HYALINE CASTS: 1 /LPF
IMM GRANULOCYTES NFR BLD AUTO: 0.3 %
INR PPP: 1.08 RATIO
IRON SATN MFR SERPL: 5 %
IRON SERPL-MCNC: 20 UG/DL
KETONES URINE: NEGATIVE
LDLC SERPL CALC-MCNC: 103 MG/DL
LEUKOCYTE ESTERASE URINE: NEGATIVE
LYMPHOCYTES # BLD AUTO: 2.29 K/UL
LYMPHOCYTES NFR BLD AUTO: 32 %
MAN DIFF?: NORMAL
MCHC RBC-ENTMCNC: 18.6 PG
MCHC RBC-ENTMCNC: 27.3 GM/DL
MCV RBC AUTO: 68.1 FL
MICROSCOPIC-UA: NORMAL
MONOCYTES # BLD AUTO: 0.49 K/UL
MONOCYTES NFR BLD AUTO: 6.9 %
NEUTROPHILS # BLD AUTO: 4.25 K/UL
NEUTROPHILS NFR BLD AUTO: 59.4 %
NITRITE URINE: NEGATIVE
NONHDLC SERPL-MCNC: 115 MG/DL
PAPP-A SERPL-ACNC: <1 MIU/ML
PARATHYROID HORMONE INTACT: 62 PG/ML
PH URINE: 6
PLATELET # BLD AUTO: 419 K/UL
POTASSIUM SERPL-SCNC: 4.2 MMOL/L
PREALB SERPL NEPH-MCNC: 20 MG/DL
PROT SERPL-MCNC: 7 G/DL
PROTEIN URINE: ABNORMAL
PT BLD: 12.6 SEC
RBC # BLD: 4.36 M/UL
RBC # FLD: 19.1 %
RED BLOOD CELLS URINE: 2 /HPF
SODIUM SERPL-SCNC: 141 MMOL/L
SPECIFIC GRAVITY URINE: 1.02
SQUAMOUS EPITHELIAL CELLS: 16 /HPF
TIBC SERPL-MCNC: 422 UG/DL
TRIGL SERPL-MCNC: 61 MG/DL
TSH SERPL-ACNC: 0.84 UIU/ML
UIBC SERPL-MCNC: 402 UG/DL
UREA BREATH TEST QL: POSITIVE
UROBILINOGEN URINE: NORMAL
VIT B1 SERPL-MCNC: 69.9 NMOL/L
VIT B12 SERPL-MCNC: 519 PG/ML
WBC # FLD AUTO: 7.15 K/UL
WHITE BLOOD CELLS URINE: 3 /HPF
ZINC SERPL-MCNC: 62 UG/DL

## 2023-03-17 RX ORDER — FLUCONAZOLE 150 MG/1
150 TABLET ORAL DAILY
Qty: 2 | Refills: 1 | Status: ACTIVE | COMMUNITY
Start: 2023-03-17 | End: 1900-01-01

## 2023-03-17 RX ORDER — CLARITHROMYCIN 500 MG/1
500 TABLET, FILM COATED ORAL TWICE DAILY
Qty: 28 | Refills: 0 | Status: ACTIVE | COMMUNITY
Start: 2023-03-17 | End: 1900-01-01

## 2023-03-17 RX ORDER — OMEPRAZOLE 20 MG/1
20 CAPSULE, DELAYED RELEASE ORAL TWICE DAILY
Qty: 28 | Refills: 0 | Status: ACTIVE | COMMUNITY
Start: 2023-03-17 | End: 1900-01-01

## 2023-03-17 RX ORDER — AMOXICILLIN 500 MG/1
500 TABLET, FILM COATED ORAL TWICE DAILY
Qty: 56 | Refills: 0 | Status: ACTIVE | COMMUNITY
Start: 2023-03-17 | End: 1900-01-01

## 2023-03-20 LAB — VIT A SERPL-MCNC: 30.6 UG/DL

## 2023-04-19 ENCOUNTER — EMERGENCY (EMERGENCY)
Facility: HOSPITAL | Age: 33
LOS: 1 days | Discharge: HOME | End: 2023-04-19
Admitting: STUDENT IN AN ORGANIZED HEALTH CARE EDUCATION/TRAINING PROGRAM
Payer: COMMERCIAL

## 2023-04-19 VITALS
WEIGHT: 229.94 LBS | HEART RATE: 88 BPM | OXYGEN SATURATION: 97 % | SYSTOLIC BLOOD PRESSURE: 106 MMHG | TEMPERATURE: 98 F | DIASTOLIC BLOOD PRESSURE: 71 MMHG | RESPIRATION RATE: 18 BRPM | HEIGHT: 66 IN

## 2023-04-19 DIAGNOSIS — Z86.2 PERSONAL HISTORY OF DISEASES OF THE BLOOD AND BLOOD-FORMING ORGANS AND CERTAIN DISORDERS INVOLVING THE IMMUNE MECHANISM: ICD-10-CM

## 2023-04-19 DIAGNOSIS — H00.036 ABSCESS OF EYELID LEFT EYE, UNSPECIFIED EYELID: ICD-10-CM

## 2023-04-19 DIAGNOSIS — Z91.013 ALLERGY TO SEAFOOD: ICD-10-CM

## 2023-04-19 DIAGNOSIS — M41.9 SCOLIOSIS, UNSPECIFIED: ICD-10-CM

## 2023-04-19 DIAGNOSIS — H02.9 UNSPECIFIED DISORDER OF EYELID: ICD-10-CM

## 2023-04-19 PROCEDURE — 99283 EMERGENCY DEPT VISIT LOW MDM: CPT

## 2023-04-19 RX ORDER — CEPHALEXIN 500 MG
500 CAPSULE ORAL ONCE
Refills: 0 | Status: COMPLETED | OUTPATIENT
Start: 2023-04-19 | End: 2023-04-19

## 2023-04-19 RX ORDER — CEPHALEXIN 500 MG
1 CAPSULE ORAL
Qty: 28 | Refills: 0
Start: 2023-04-19 | End: 2023-04-25

## 2023-04-19 RX ADMIN — Medication 500 MILLIGRAM(S): at 22:00

## 2023-04-19 RX ADMIN — Medication 1 TABLET(S): at 22:00

## 2023-04-19 NOTE — ED ADULT NURSE NOTE - NSFALLRSKHARMRISK_ED_ALL_ED
Continue: Artificial Tears (PF) (dextran 70-hypromellose): dropperette: twice a day
GLAUCOMA SUSPECT, OU : ENLARGED OPTIC NERVE CUPPING. RETURN FOR FOLLOW UP AS SCHEDULED.
General:
Medications:
no

## 2023-04-19 NOTE — ED ADULT NURSE NOTE - OBJECTIVE STATEMENT
Patient a+o x4 c/o possible spider bite to lateral left eye. Maintaining patent airway, denies vision changes. Presents with red bump to lateral left eye. Antibiotics given; tolerated well.

## 2023-04-19 NOTE — ED PROVIDER NOTE - NSFOLLOWUPINSTRUCTIONS_ED_ALL_ED_FT
Take one tab of bactrim ds twice daily for 7 days.    Take one tab of keflex four times daily for 7 days.    Apply a warm complex to the eye three times daily.    Avoid squeezing the eye.    Return in 2 days if your symptoms do not improve or worsen.

## 2023-04-19 NOTE — ED PROVIDER NOTE - NS ED ROS FT
Constitutional: No fever. No chills.  Eyes: No redness. No discharge. No vision change.   ENT: No sore throat. No ear pain.  Cardiovascular: No chest pain. No leg swelling.  Respiratory: No cough. No shortness of breath.  GI: No abdominal pain. No vomiting. No diarrhea.   MSK: No joint pain. No back pain.   Skin: No rash. No abrasions. +lesion  Neuro: No numbness. No weakness.   Psych: No known mental health issues.

## 2023-04-19 NOTE — ED PROVIDER NOTE - PHYSICAL EXAMINATION
VITAL SIGNS: I have reviewed nursing notes and confirm.  CONSTITUTIONAL: Well-developed; in no acute distress.   SKIN:  warm and dry, no acute rash. small pustular lesions lateral to left eye with erythema extending inferior to eye. no periorbital edema or erythema.   HEAD:  normocephalic, atraumatic.  EYES: PERRL, EOM intact; no pain with EOM; conjunctiva and sclera clear.  ENT: No nasal discharge; airway clear.   NECK: Supple; non tender.  EXT: Normal ROM. No clubbing, cyanosis or edema. 2+ pulses to b/l ue/le.  NEURO: Alert, oriented, grossly unremarkable  PSYCH: Cooperative, mood and affect appropriate.

## 2023-04-19 NOTE — ED PROVIDER NOTE - CLINICAL SUMMARY MEDICAL DECISION MAKING FREE TEXT BOX
Pt is afebrile and hemodynamically stable. She has a small pustular lesion lateral to left eye. No evidence of preseptal or orbital cellulitis. Will treat with antibiotics and warm compresses. Pt instructed to return if area increases in size after starting antibiotics. Return precautions given.

## 2023-04-19 NOTE — ED PROVIDER NOTE - PATIENT PORTAL LINK FT
You can access the FollowMyHealth Patient Portal offered by [facility] by registering at the following website: [email or web address]. By joining [employer]’s FollowMyHealth portal, you will also be able to view your health information using other applications (apps) compatible with our system.

## 2023-04-19 NOTE — ED PROVIDER NOTE - OBJECTIVE STATEMENT
33yo female without reported pmhx presents with pustular lesion next to left eye. Pt states area started very small and then she pushed on the area and it increased in size. Now having redness extending inferior to eye. She denies fever, eye pain, pain with eye movement, eye redness, eye drainage, headache, vomiting, facial swelling. She has not used any medications at home.

## 2023-09-08 ENCOUNTER — APPOINTMENT (OUTPATIENT)
Dept: ULTRASOUND IMAGING | Facility: HOSPITAL | Age: 33
End: 2023-09-08

## 2023-09-08 ENCOUNTER — APPOINTMENT (OUTPATIENT)
Dept: MAMMOGRAPHY | Facility: HOSPITAL | Age: 33
End: 2023-09-08

## 2023-09-12 ENCOUNTER — INPATIENT (INPATIENT)
Facility: HOSPITAL | Age: 33
LOS: 1 days | Discharge: HOME | DRG: 832 | End: 2023-09-14
Attending: STUDENT IN AN ORGANIZED HEALTH CARE EDUCATION/TRAINING PROGRAM | Admitting: GENERAL ACUTE CARE HOSPITAL
Payer: COMMERCIAL

## 2023-09-12 VITALS
HEART RATE: 102 BPM | TEMPERATURE: 100 F | SYSTOLIC BLOOD PRESSURE: 128 MMHG | RESPIRATION RATE: 18 BRPM | DIASTOLIC BLOOD PRESSURE: 86 MMHG | OXYGEN SATURATION: 98 % | HEIGHT: 66 IN | WEIGHT: 220.02 LBS

## 2023-09-12 DIAGNOSIS — O99.891 OTHER SPECIFIED DISEASES AND CONDITIONS COMPLICATING PREGNANCY: ICD-10-CM

## 2023-09-12 DIAGNOSIS — E11.9 TYPE 2 DIABETES MELLITUS WITHOUT COMPLICATIONS: ICD-10-CM

## 2023-09-12 DIAGNOSIS — D64.9 ANEMIA, UNSPECIFIED: ICD-10-CM

## 2023-09-12 DIAGNOSIS — N12 TUBULO-INTERSTITIAL NEPHRITIS, NOT SPECIFIED AS ACUTE OR CHRONIC: ICD-10-CM

## 2023-09-12 DIAGNOSIS — Z29.9 ENCOUNTER FOR PROPHYLACTIC MEASURES, UNSPECIFIED: ICD-10-CM

## 2023-09-12 LAB
ALBUMIN SERPL ELPH-MCNC: 3.7 G/DL — SIGNIFICANT CHANGE UP (ref 3.3–5)
ALP SERPL-CCNC: 83 U/L — SIGNIFICANT CHANGE UP (ref 40–120)
ALT FLD-CCNC: 8 U/L — LOW (ref 10–45)
ANION GAP SERPL CALC-SCNC: 9 MMOL/L — SIGNIFICANT CHANGE UP (ref 5–17)
ANISOCYTOSIS BLD QL: SLIGHT — SIGNIFICANT CHANGE UP
APPEARANCE UR: ABNORMAL
AST SERPL-CCNC: 11 U/L — SIGNIFICANT CHANGE UP (ref 10–40)
BACTERIA # UR AUTO: ABNORMAL /HPF
BASOPHILS # BLD AUTO: 0 K/UL — SIGNIFICANT CHANGE UP (ref 0–0.2)
BASOPHILS NFR BLD AUTO: 0 % — SIGNIFICANT CHANGE UP (ref 0–2)
BILIRUB SERPL-MCNC: <0.2 MG/DL — SIGNIFICANT CHANGE UP (ref 0.2–1.2)
BILIRUB UR-MCNC: NEGATIVE — SIGNIFICANT CHANGE UP
BLD GP AB SCN SERPL QL: NEGATIVE — SIGNIFICANT CHANGE UP
BUN SERPL-MCNC: 7 MG/DL — SIGNIFICANT CHANGE UP (ref 7–23)
BURR CELLS BLD QL SMEAR: PRESENT — SIGNIFICANT CHANGE UP
CALCIUM SERPL-MCNC: 9.2 MG/DL — SIGNIFICANT CHANGE UP (ref 8.4–10.5)
CHLORIDE SERPL-SCNC: 103 MMOL/L — SIGNIFICANT CHANGE UP (ref 96–108)
CO2 SERPL-SCNC: 23 MMOL/L — SIGNIFICANT CHANGE UP (ref 22–31)
COLOR SPEC: YELLOW — SIGNIFICANT CHANGE UP
COMMENT - URINE: SIGNIFICANT CHANGE UP
CREAT SERPL-MCNC: 0.63 MG/DL — SIGNIFICANT CHANGE UP (ref 0.5–1.3)
DACRYOCYTES BLD QL SMEAR: SLIGHT — SIGNIFICANT CHANGE UP
DIFF PNL FLD: NEGATIVE — SIGNIFICANT CHANGE UP
EGFR: 119 ML/MIN/1.73M2 — SIGNIFICANT CHANGE UP
ELLIPTOCYTES BLD QL SMEAR: SLIGHT — SIGNIFICANT CHANGE UP
EOSINOPHIL # BLD AUTO: 0.09 K/UL — SIGNIFICANT CHANGE UP (ref 0–0.5)
EOSINOPHIL NFR BLD AUTO: 0.9 % — SIGNIFICANT CHANGE UP (ref 0–6)
EPI CELLS # UR: ABNORMAL /HPF (ref 0–5)
GLUCOSE SERPL-MCNC: 114 MG/DL — HIGH (ref 70–99)
GLUCOSE UR QL: NEGATIVE — SIGNIFICANT CHANGE UP
HCG SERPL-ACNC: HIGH MIU/ML
HCT VFR BLD CALC: 27.8 % — LOW (ref 34.5–45)
HGB BLD-MCNC: 8.5 G/DL — LOW (ref 11.5–15.5)
HYPOCHROMIA BLD QL: SIGNIFICANT CHANGE UP
KETONES UR-MCNC: ABNORMAL MG/DL
LACTATE SERPL-SCNC: 1.1 MMOL/L — SIGNIFICANT CHANGE UP (ref 0.5–2)
LEUKOCYTE ESTERASE UR-ACNC: ABNORMAL
LYMPHOCYTES # BLD AUTO: 2.89 K/UL — SIGNIFICANT CHANGE UP (ref 1–3.3)
LYMPHOCYTES # BLD AUTO: 27.7 % — SIGNIFICANT CHANGE UP (ref 13–44)
MANUAL SMEAR VERIFICATION: SIGNIFICANT CHANGE UP
MCHC RBC-ENTMCNC: 19.5 PG — LOW (ref 27–34)
MCHC RBC-ENTMCNC: 30.6 GM/DL — LOW (ref 32–36)
MCV RBC AUTO: 63.6 FL — LOW (ref 80–100)
MICROCYTES BLD QL: SLIGHT — SIGNIFICANT CHANGE UP
MONOCYTES # BLD AUTO: 0.55 K/UL — SIGNIFICANT CHANGE UP (ref 0–0.9)
MONOCYTES NFR BLD AUTO: 5.3 % — SIGNIFICANT CHANGE UP (ref 2–14)
NEUTROPHILS # BLD AUTO: 6.89 K/UL — SIGNIFICANT CHANGE UP (ref 1.8–7.4)
NEUTROPHILS NFR BLD AUTO: 66.1 % — SIGNIFICANT CHANGE UP (ref 43–77)
NITRITE UR-MCNC: POSITIVE
OVALOCYTES BLD QL SMEAR: SLIGHT — SIGNIFICANT CHANGE UP
PH UR: 6 — SIGNIFICANT CHANGE UP (ref 5–8)
PLAT MORPH BLD: NORMAL — SIGNIFICANT CHANGE UP
PLATELET # BLD AUTO: 372 K/UL — SIGNIFICANT CHANGE UP (ref 150–400)
POIKILOCYTOSIS BLD QL AUTO: SLIGHT — SIGNIFICANT CHANGE UP
POLYCHROMASIA BLD QL SMEAR: SLIGHT — SIGNIFICANT CHANGE UP
POTASSIUM SERPL-MCNC: 4 MMOL/L — SIGNIFICANT CHANGE UP (ref 3.5–5.3)
POTASSIUM SERPL-SCNC: 4 MMOL/L — SIGNIFICANT CHANGE UP (ref 3.5–5.3)
PROT SERPL-MCNC: 7.3 G/DL — SIGNIFICANT CHANGE UP (ref 6–8.3)
PROT UR-MCNC: NEGATIVE MG/DL — SIGNIFICANT CHANGE UP
RBC # BLD: 4.37 M/UL — SIGNIFICANT CHANGE UP (ref 3.8–5.2)
RBC # FLD: 20.6 % — HIGH (ref 10.3–14.5)
RBC BLD AUTO: ABNORMAL
RBC CASTS # UR COMP ASSIST: < 5 /HPF — SIGNIFICANT CHANGE UP
RH IG SCN BLD-IMP: POSITIVE — SIGNIFICANT CHANGE UP
SCHISTOCYTES BLD QL AUTO: SLIGHT — SIGNIFICANT CHANGE UP
SMUDGE CELLS # BLD: PRESENT — SIGNIFICANT CHANGE UP
SODIUM SERPL-SCNC: 135 MMOL/L — SIGNIFICANT CHANGE UP (ref 135–145)
SP GR SPEC: 1.02 — SIGNIFICANT CHANGE UP (ref 1–1.03)
UROBILINOGEN FLD QL: 1 E.U./DL — SIGNIFICANT CHANGE UP
WBC # BLD: 10.43 K/UL — SIGNIFICANT CHANGE UP (ref 3.8–10.5)
WBC # FLD AUTO: 10.43 K/UL — SIGNIFICANT CHANGE UP (ref 3.8–10.5)
WBC UR QL: > 10 /HPF

## 2023-09-12 PROCEDURE — 99285 EMERGENCY DEPT VISIT HI MDM: CPT

## 2023-09-12 PROCEDURE — 76801 OB US < 14 WKS SINGLE FETUS: CPT | Mod: 26

## 2023-09-12 PROCEDURE — 76817 TRANSVAGINAL US OBSTETRIC: CPT | Mod: 26,59

## 2023-09-12 PROCEDURE — 99223 1ST HOSP IP/OBS HIGH 75: CPT

## 2023-09-12 RX ORDER — ACETAMINOPHEN 500 MG
1000 TABLET ORAL ONCE
Refills: 0 | Status: COMPLETED | OUTPATIENT
Start: 2023-09-12 | End: 2023-09-12

## 2023-09-12 RX ORDER — DEXTROSE 50 % IN WATER 50 %
15 SYRINGE (ML) INTRAVENOUS ONCE
Refills: 0 | Status: DISCONTINUED | OUTPATIENT
Start: 2023-09-12 | End: 2023-09-14

## 2023-09-12 RX ORDER — SODIUM CHLORIDE 9 MG/ML
1000 INJECTION, SOLUTION INTRAVENOUS
Refills: 0 | Status: DISCONTINUED | OUTPATIENT
Start: 2023-09-12 | End: 2023-09-14

## 2023-09-12 RX ORDER — DEXTROSE 50 % IN WATER 50 %
25 SYRINGE (ML) INTRAVENOUS ONCE
Refills: 0 | Status: DISCONTINUED | OUTPATIENT
Start: 2023-09-12 | End: 2023-09-14

## 2023-09-12 RX ORDER — ACETAMINOPHEN 500 MG
650 TABLET ORAL EVERY 6 HOURS
Refills: 0 | Status: DISCONTINUED | OUTPATIENT
Start: 2023-09-12 | End: 2023-09-14

## 2023-09-12 RX ORDER — SODIUM CHLORIDE 9 MG/ML
1000 INJECTION INTRAMUSCULAR; INTRAVENOUS; SUBCUTANEOUS ONCE
Refills: 0 | Status: COMPLETED | OUTPATIENT
Start: 2023-09-12 | End: 2023-09-12

## 2023-09-12 RX ORDER — CEFTRIAXONE 500 MG/1
1000 INJECTION, POWDER, FOR SOLUTION INTRAMUSCULAR; INTRAVENOUS EVERY 24 HOURS
Refills: 0 | Status: DISCONTINUED | OUTPATIENT
Start: 2023-09-13 | End: 2023-09-14

## 2023-09-12 RX ORDER — GLUCAGON INJECTION, SOLUTION 0.5 MG/.1ML
1 INJECTION, SOLUTION SUBCUTANEOUS ONCE
Refills: 0 | Status: DISCONTINUED | OUTPATIENT
Start: 2023-09-12 | End: 2023-09-14

## 2023-09-12 RX ORDER — ONDANSETRON 8 MG/1
4 TABLET, FILM COATED ORAL EVERY 8 HOURS
Refills: 0 | Status: DISCONTINUED | OUTPATIENT
Start: 2023-09-12 | End: 2023-09-14

## 2023-09-12 RX ORDER — CEFTRIAXONE 500 MG/1
1000 INJECTION, POWDER, FOR SOLUTION INTRAMUSCULAR; INTRAVENOUS ONCE
Refills: 0 | Status: COMPLETED | OUTPATIENT
Start: 2023-09-12 | End: 2023-09-12

## 2023-09-12 RX ORDER — DEXTROSE 50 % IN WATER 50 %
12.5 SYRINGE (ML) INTRAVENOUS ONCE
Refills: 0 | Status: DISCONTINUED | OUTPATIENT
Start: 2023-09-12 | End: 2023-09-14

## 2023-09-12 RX ORDER — INSULIN LISPRO 100/ML
VIAL (ML) SUBCUTANEOUS
Refills: 0 | Status: DISCONTINUED | OUTPATIENT
Start: 2023-09-12 | End: 2023-09-14

## 2023-09-12 RX ORDER — LANOLIN ALCOHOL/MO/W.PET/CERES
3 CREAM (GRAM) TOPICAL AT BEDTIME
Refills: 0 | Status: DISCONTINUED | OUTPATIENT
Start: 2023-09-12 | End: 2023-09-14

## 2023-09-12 RX ADMIN — SODIUM CHLORIDE 1000 MILLILITER(S): 9 INJECTION INTRAMUSCULAR; INTRAVENOUS; SUBCUTANEOUS at 19:06

## 2023-09-12 RX ADMIN — CEFTRIAXONE 100 MILLIGRAM(S): 500 INJECTION, POWDER, FOR SOLUTION INTRAMUSCULAR; INTRAVENOUS at 17:53

## 2023-09-12 RX ADMIN — Medication 400 MILLIGRAM(S): at 19:15

## 2023-09-12 RX ADMIN — CEFTRIAXONE 1000 MILLIGRAM(S): 500 INJECTION, POWDER, FOR SOLUTION INTRAMUSCULAR; INTRAVENOUS at 19:06

## 2023-09-12 RX ADMIN — Medication 650 MILLIGRAM(S): at 22:38

## 2023-09-12 RX ADMIN — SODIUM CHLORIDE 1000 MILLILITER(S): 9 INJECTION INTRAMUSCULAR; INTRAVENOUS; SUBCUTANEOUS at 17:53

## 2023-09-12 RX ADMIN — Medication 650 MILLIGRAM(S): at 23:01

## 2023-09-12 NOTE — H&P ADULT - NSHPLABSRESULTS_GEN_ALL_CORE
LABS:                        8.5    10.43 )-----------( 372      ( 12 Sep 2023 17:32 )             27.8     09-12    135  |  103  |  7   ----------------------------<  114<H>  4.0   |  23  |  0.63    Ca    9.2      12 Sep 2023 17:32    TPro  7.3  /  Alb  3.7  /  TBili  <0.2  /  DBili  x   /  AST  11  /  ALT  8<L>  /  AlkPhos  83  09-12      Urinalysis Basic - ( 12 Sep 2023 17:32 )    Color: x / Appearance: x / SG: x / pH: x  Gluc: 114 mg/dL / Ketone: x  / Bili: x / Urobili: x   Blood: x / Protein: x / Nitrite: x   Leuk Esterase: x / RBC: x / WBC x   Sq Epi: x / Non Sq Epi: x / Bacteria: x          RADIOLOGY & ADDITIONAL TESTS:    MEDICATIONS  (STANDING):    MEDICATIONS  (PRN):  acetaminophen     Tablet .. 650 milliGRAM(s) Oral every 6 hours PRN Temp greater or equal to 38C (100.4F), Mild Pain (1 - 3)  aluminum hydroxide/magnesium hydroxide/simethicone Suspension 30 milliLiter(s) Oral every 4 hours PRN Dyspepsia  melatonin 3 milliGRAM(s) Oral at bedtime PRN Insomnia  ondansetron Injectable 4 milliGRAM(s) IV Push every 8 hours PRN Nausea and/or Vomiting

## 2023-09-12 NOTE — H&P ADULT - SOCIAL HISTORY: ALCOHOL USE
Drink a bottle of wine at a time, however very rarely (1 bottle of wine roughly Q6 months). Last had ETOH 3 days prior to admission, had one bottle of wine in the sitting.

## 2023-09-12 NOTE — H&P ADULT - HISTORY OF PRESENT ILLNESS
33F  female (9 and 17 years ago), LMP in July, hx of kidney infections (5 ED visits, never hospitalized, never while pregnant), p/w 2d hx b/l flank pain (L worse than R), hematuria (Pinkish orange), and dysuria.   Pt reports these sx similar to what she felt with her "previous kidney infections." Only previously recorded UCx grew Strep agalactiae in , sensitive to Levofloxacin/Ampicillin/Penicillin/Vanc.   Pt denies n/v/d, vaginal discharge/bleeding, syncope.     Of note patient was found to have +beta hcg; as per ED physician discussion patient states she does not know at this moment if she desires to keep pregnancy.    ED Vitals: T99.8F  --> 86, /86, SpO2 98 RA  ED Labs: WBC 10.4 differential WNL. Hgb 8.5, MCV 63.6, Plt 372. Lactate 1.1. CMP WNL. Serum HCG [id number]  ED Studies: UA SG 1.020 +WBC, LE/N. +Bacteria. However + Squamous epithelial cells  US Transvaginal: Single viable intrauterine gestation (US age of 11weeks 4days). Upper limit of yolk sac size, c/f pregnancy failure.   Of note UCx from  shows growth of Strep agalactiae (GBS) sensitive to levofloxacin, penicillins, ampicillin, vanc  ED Interventions: Ofrimev 1g, CTX 1g, NS 1L bolus 33F  female (8 and 16 years ago), LMP in July, T2DM, hx of kidney infections (5 ED visits, never hospitalized, never while pregnant), p/w 2d hx b/l flank pain (L worse than R), hematuria (Pinkish orange), and dysuria.   Pt reports these sx similar to what she felt with her "previous kidney infections." Only previously recorded UCx grew Strep agalactiae in , sensitive to Levofloxacin/Ampicillin/Penicillin/Vanc.   Pt denies n/v/d, vaginal discharge/bleeding, syncope.     Of note patient was found to have +beta hcg; as per ED physician discussion patient states she does not know at this moment if she desires to keep pregnancy.    ED Vitals: T99.8F  --> 86, /86, SpO2 98 RA  ED Labs: WBC 10.4 differential WNL. Hgb 8.5, MCV 63.6, Plt 372. Lactate 1.1. CMP WNL. Serum HCG [id number]  ED Studies: UA SG 1.020 +WBC, LE/N. +Bacteria. However + Squamous epithelial cells  US Transvaginal: Single viable intrauterine gestation (US age of 11weeks 4days). Upper limit of yolk sac size, c/f pregnancy failure.   Of note UCx from  shows growth of Strep agalactiae (GBS) sensitive to levofloxacin, penicillins, ampicillin, vanc  ED Interventions: Ofrimev 1g, CTX 1g, NS 1L bolus

## 2023-09-12 NOTE — H&P ADULT - NSHPREVIEWOFSYSTEMS_GEN_ALL_CORE
Patient denies h/n/v/d, fever, chills, cp, palpitations, sob, abd pain, leg swelling, rashes, dysuria, and changes in BM.    Pt reports urinary sx as per HPI. Denies vaginal discharge, bleeding, or pain.

## 2023-09-12 NOTE — ED PROVIDER NOTE - PHYSICAL EXAMINATION
Gen - NAD; well-appearing; A+Ox3   HEENT - NCAT, EOMI, moist mucous membranes, clear oropharynx  Neck - supple  Resp - CTAB, no increased WOB  CV -  RRR, no m/r/g  Abd - soft, ND, +suprapubic tenderness; no guarding or rebound  Back - ++b/l CVA tenderness  MSK - FROM of b/l UE and LE, no gross deformities  Extrem - no LE edema/erythema/tenderness  Neuro - no focal motor or sensation deficits  Skin - warm, well perfused

## 2023-09-12 NOTE — ED PROVIDER NOTE - CLINICAL SUMMARY MEDICAL DECISION MAKING FREE TEXT BOX
33 year old  female, LMP in July, hx of kidney infections, presenting with b/l flank pain and dysuria x 2d. Overall nontoxic/well appearing here but noted to be tachy to 102 with PO temp 99.8 in setting of high suspicion for ascending UTI--will panculture and give empiric 1L IVF bolus with CTX dose. Patient also found to have UCG positive--will need ectopic r/o. Anticipate need for admission for complicated pyelo in setting of pregnancy.

## 2023-09-12 NOTE — ED PROVIDER NOTE - OBJECTIVE STATEMENT
33 year old  female, LMP in July, hx of kidney infections, presenting with b/l flank pain and dysuria x 2d. States having symptoms similar to what she felt with her previous kidney infections, +b/l flank pain with dysuria and suprapubic discomfort, subjective fevers. No n/v/d, vaginal discharge/bleeding, syncope.     Of note patient was found to have +ucg prior to my eval here in ED--patient states she does not know at this moment if she desires to keep pregnancy.

## 2023-09-12 NOTE — H&P ADULT - ASSESSMENT
33F  female, LMP in July, hx of kidney infections, p/w 2d hx b/l flank pain and dysuria. A/f management of pyelonephritis iso 11 week pregnancy

## 2023-09-12 NOTE — H&P ADULT - NSHPPHYSICALEXAM_GEN_ALL_CORE
PHYSICAL EXAM:  Constitutional: NAD, comfortable in bed.  HEENT: NC/AT, PERRLA, EOMI, no conjunctival pallor or scleral icterus, MMM  Neck: Supple, no JVD  Respiratory: CTA B/L. No w/r/r.   Cardiovascular: RRR, normal S1 and S2, no m/r/g.   Gastrointestinal: +BS, soft ND TTP to suprapubic region, no guarding or rebound tenderness, no palpable masses   : +Suprapubic and CVA TTP b/l.   Extremities: wwp; no cyanosis, clubbing or edema.   Vascular: Pulses equal and strong throughout.   Neurological: AAOx3, no CN deficits, strength and sensation intact throughout.   Skin: No gross skin abnormalities or rashes

## 2023-09-12 NOTE — ED ADULT TRIAGE NOTE - CHIEF COMPLAINT QUOTE
Patient c/o of left flank pain X 2 days, w/ dysuria, w/ hematuria.  Also states " my cervix is hurting X 2 days."  Denies any vaginal bleed or discharge, states had some fever today.

## 2023-09-12 NOTE — H&P ADULT - PROBLEM SELECTOR PLAN 3
Hgb 8.5 MCV 63.6 on admission. No active bleeding noted on exam or HPI, has not had menstrual period since July. Aknowlege anemia common inpregnancy however this is usually macrocytic anemia.     - Iron panel, Retic ct in AM Hgb 8.5 MCV 63.6 on admission. No active bleeding noted on exam or HPI, has not had menstrual period since July. Pt thinks she used to take iron pills. Acknowledge anemia common inpregnancy however this is usually macrocytic anemia.     - Iron panel, Retic ct in AM

## 2023-09-12 NOTE — ED ADULT NURSE NOTE - OBJECTIVE STATEMENT
32 y/o F with pmhx "kidney damage from ibuprofen", HTN, kidney infection requiring iv abx, and uterine fibroids presents to the ED c/o "cervix" pain and b/l flank pain x2 days. States she was admitted to the hospital 1 year ago for kidney infection and had damage from taking x5 ibuprofen q6hr at that time. Reports having GYN see her in January for fibroids on her cervix and them stating to "monitor" them at that time. Denies seeing GYN for sx. Endorses hematuria and subjective fever today so she came here. Denies N/V/D, vaginal bleeding, abdominal pain, and any other complaints at this time. On exam, A&Ox4, NAD, ambulatory on RA. . Afebrile. Bilateral CVA TTP. Urine cloudy and dark yellow - specimen sent to lab. Awaiting results.

## 2023-09-12 NOTE — H&P ADULT - PROBLEM SELECTOR PLAN 4
F: None  E: Replete PRN  N: Regular diet  DVT ppx: Lovenox  Code: Full Home med Metformin 500 Q12    - mISS while inpt

## 2023-09-12 NOTE — H&P ADULT - ATTENDING COMMENTS
34 yo  F with PMHx prior pyelonephritis, DM px from home with 2d hx of dysuria and L flank pain found to be 11-weeks pregnant admitted for further management of pyelonephritis.      #Pyelonephritis – Not meeting SIRS/sepsis apart from tachycardia to HR 100s. UA positive for >10 WBC, many bacteria, trace LE, positive nitrites. Newly pregnant discovered this admission in ED ~11 weeks, confirmed on TVUS. OB-GYN notified of admission. On my exam ~10PM, appears comfortable, no acute distress, but does have exquisite tenderness with L-CVA. Will tx for pyelonephritis at this time. Continue CTX 1g Q24. F/U urine cx, blood cx.     Agree with remainder of resident plan as above.

## 2023-09-12 NOTE — H&P ADULT - PROBLEM SELECTOR PLAN 1
Only previously recorded UCx grew Strep agalactiae in 2021, sensitive to Levofloxacin/Ampicillin/Penicillin/Vanc.   Pt denies n/v/d, vaginal discharge/bleeding, syncope.  Does not meet SIRS criteria. Lactate 1.1. No known risk of multi-drug resistant infection.   - CTX 2g Qd, no contraindications iso pregnancy. Will also cover GBS if recurrence of this organism   - Renal US Only previously recorded UCx grew Strep agalactiae in , sensitive to Levofloxacin/Ampicillin/Penicillin/Vanc.   Pt denies n/v/d, vaginal discharge/bleeding, syncope.  Does not meet SIRS criteria. Lactate 1.1. No known risk of multi-drug resistant infection.   - CTX 2g Qd, no contraindications iso pregnancy. Will also cover GBS if recurrence of this organism   - Renal US      If confirmed to be GBS recurrent infection, pt should receive intrapartum chemoprophylaxis at the time of delivery to prevent  infection after discharge if she decides to proceed with pregnancy Only previously recorded UCx grew Strep agalactiae in , sensitive to Levofloxacin/Ampicillin/Penicillin/Vanc.   Pt denies n/v/d, vaginal discharge/bleeding, syncope.  Does not meet SIRS criteria. Lactate 1.1. No known risk of multi-drug resistant infection.   - CTX 1g Qd, no contraindications iso pregnancy. Will also cover GBS if recurrence of this organism       If confirmed to be GBS recurrent infection, pt should receive intrapartum chemoprophylaxis at the time of delivery to prevent  infection after discharge if she decides to proceed with pregnancy

## 2023-09-13 ENCOUNTER — TRANSCRIPTION ENCOUNTER (OUTPATIENT)
Age: 33
End: 2023-09-13

## 2023-09-13 DIAGNOSIS — Z34.90 ENCOUNTER FOR SUPERVISION OF NORMAL PREGNANCY, UNSPECIFIED, UNSPECIFIED TRIMESTER: ICD-10-CM

## 2023-09-13 LAB
A1C WITH ESTIMATED AVERAGE GLUCOSE RESULT: 5.9 % — HIGH (ref 4–5.6)
ALBUMIN SERPL ELPH-MCNC: 3.2 G/DL — LOW (ref 3.3–5)
ALP SERPL-CCNC: 69 U/L — SIGNIFICANT CHANGE UP (ref 40–120)
ALT FLD-CCNC: 7 U/L — LOW (ref 10–45)
ANION GAP SERPL CALC-SCNC: 9 MMOL/L — SIGNIFICANT CHANGE UP (ref 5–17)
AST SERPL-CCNC: 9 U/L — LOW (ref 10–40)
BASOPHILS # BLD AUTO: 0.03 K/UL — SIGNIFICANT CHANGE UP (ref 0–0.2)
BASOPHILS NFR BLD AUTO: 0.4 % — SIGNIFICANT CHANGE UP (ref 0–2)
BILIRUB SERPL-MCNC: <0.2 MG/DL — SIGNIFICANT CHANGE UP (ref 0.2–1.2)
BLD GP AB SCN SERPL QL: NEGATIVE — SIGNIFICANT CHANGE UP
BUN SERPL-MCNC: 5 MG/DL — LOW (ref 7–23)
CALCIUM SERPL-MCNC: 8.4 MG/DL — SIGNIFICANT CHANGE UP (ref 8.4–10.5)
CHLORIDE SERPL-SCNC: 106 MMOL/L — SIGNIFICANT CHANGE UP (ref 96–108)
CO2 SERPL-SCNC: 21 MMOL/L — LOW (ref 22–31)
CREAT SERPL-MCNC: 0.55 MG/DL — SIGNIFICANT CHANGE UP (ref 0.5–1.3)
EGFR: 123 ML/MIN/1.73M2 — SIGNIFICANT CHANGE UP
EOSINOPHIL # BLD AUTO: 0.07 K/UL — SIGNIFICANT CHANGE UP (ref 0–0.5)
EOSINOPHIL NFR BLD AUTO: 0.9 % — SIGNIFICANT CHANGE UP (ref 0–6)
ESTIMATED AVERAGE GLUCOSE: 123 MG/DL — HIGH (ref 68–114)
FERRITIN SERPL-MCNC: 6 NG/ML — LOW (ref 15–150)
GLUCOSE BLDC GLUCOMTR-MCNC: 101 MG/DL — HIGH (ref 70–99)
GLUCOSE BLDC GLUCOMTR-MCNC: 111 MG/DL — HIGH (ref 70–99)
GLUCOSE BLDC GLUCOMTR-MCNC: 117 MG/DL — HIGH (ref 70–99)
GLUCOSE BLDC GLUCOMTR-MCNC: 129 MG/DL — HIGH (ref 70–99)
GLUCOSE BLDC GLUCOMTR-MCNC: 97 MG/DL — SIGNIFICANT CHANGE UP (ref 70–99)
GLUCOSE SERPL-MCNC: 96 MG/DL — SIGNIFICANT CHANGE UP (ref 70–99)
HAPTOGLOB SERPL-MCNC: 151 MG/DL — SIGNIFICANT CHANGE UP (ref 34–200)
HCT VFR BLD CALC: 25.5 % — LOW (ref 34.5–45)
HGB BLD-MCNC: 7.8 G/DL — LOW (ref 11.5–15.5)
IMM GRANULOCYTES NFR BLD AUTO: 0.4 % — SIGNIFICANT CHANGE UP (ref 0–0.9)
IRON SATN MFR SERPL: 27 UG/DL — LOW (ref 30–160)
IRON SATN MFR SERPL: 6 % — LOW (ref 14–50)
LDH SERPL L TO P-CCNC: 231 U/L — SIGNIFICANT CHANGE UP (ref 50–242)
LYMPHOCYTES # BLD AUTO: 2.92 K/UL — SIGNIFICANT CHANGE UP (ref 1–3.3)
LYMPHOCYTES # BLD AUTO: 37.3 % — SIGNIFICANT CHANGE UP (ref 13–44)
MAGNESIUM SERPL-MCNC: 1.7 MG/DL — SIGNIFICANT CHANGE UP (ref 1.6–2.6)
MCHC RBC-ENTMCNC: 19.4 PG — LOW (ref 27–34)
MCHC RBC-ENTMCNC: 30.6 GM/DL — LOW (ref 32–36)
MCV RBC AUTO: 63.4 FL — LOW (ref 80–100)
MONOCYTES # BLD AUTO: 0.64 K/UL — SIGNIFICANT CHANGE UP (ref 0–0.9)
MONOCYTES NFR BLD AUTO: 8.2 % — SIGNIFICANT CHANGE UP (ref 2–14)
NEUTROPHILS # BLD AUTO: 4.14 K/UL — SIGNIFICANT CHANGE UP (ref 1.8–7.4)
NEUTROPHILS NFR BLD AUTO: 52.8 % — SIGNIFICANT CHANGE UP (ref 43–77)
NRBC # BLD: 0 /100 WBCS — SIGNIFICANT CHANGE UP (ref 0–0)
PHOSPHATE SERPL-MCNC: 3.7 MG/DL — SIGNIFICANT CHANGE UP (ref 2.5–4.5)
PLATELET # BLD AUTO: 318 K/UL — SIGNIFICANT CHANGE UP (ref 150–400)
POTASSIUM SERPL-MCNC: 4 MMOL/L — SIGNIFICANT CHANGE UP (ref 3.5–5.3)
POTASSIUM SERPL-SCNC: 4 MMOL/L — SIGNIFICANT CHANGE UP (ref 3.5–5.3)
PROT SERPL-MCNC: 6.2 G/DL — SIGNIFICANT CHANGE UP (ref 6–8.3)
RBC # BLD: 4.02 M/UL — SIGNIFICANT CHANGE UP (ref 3.8–5.2)
RBC # BLD: 4.16 M/UL — SIGNIFICANT CHANGE UP (ref 3.8–5.2)
RBC # FLD: 20.9 % — HIGH (ref 10.3–14.5)
RETICS #: 65.3 K/UL — SIGNIFICANT CHANGE UP (ref 25–125)
RETICS/RBC NFR: 1.6 % — SIGNIFICANT CHANGE UP (ref 0.5–2.5)
RH IG SCN BLD-IMP: POSITIVE — SIGNIFICANT CHANGE UP
SODIUM SERPL-SCNC: 136 MMOL/L — SIGNIFICANT CHANGE UP (ref 135–145)
T PALLIDUM AB TITR SER: NEGATIVE — SIGNIFICANT CHANGE UP
TIBC SERPL-MCNC: 432 UG/DL — HIGH (ref 220–430)
TRANSFERRIN SERPL-MCNC: 346 MG/DL — SIGNIFICANT CHANGE UP (ref 200–360)
UIBC SERPL-MCNC: 405 UG/DL — HIGH (ref 110–370)
WBC # BLD: 7.83 K/UL — SIGNIFICANT CHANGE UP (ref 3.8–10.5)
WBC # FLD AUTO: 7.83 K/UL — SIGNIFICANT CHANGE UP (ref 3.8–10.5)

## 2023-09-13 PROCEDURE — 76770 US EXAM ABDO BACK WALL COMP: CPT | Mod: 26

## 2023-09-13 PROCEDURE — 99232 SBSQ HOSP IP/OBS MODERATE 35: CPT | Mod: GC

## 2023-09-13 RX ORDER — FERROUS SULFATE 325(65) MG
325 TABLET ORAL DAILY
Refills: 0 | Status: DISCONTINUED | OUTPATIENT
Start: 2023-09-13 | End: 2023-09-14

## 2023-09-13 RX ORDER — INFLUENZA VIRUS VACCINE 15; 15; 15; 15 UG/.5ML; UG/.5ML; UG/.5ML; UG/.5ML
0.5 SUSPENSION INTRAMUSCULAR ONCE
Refills: 0 | Status: DISCONTINUED | OUTPATIENT
Start: 2023-09-13 | End: 2023-09-14

## 2023-09-13 RX ADMIN — ONDANSETRON 4 MILLIGRAM(S): 8 TABLET, FILM COATED ORAL at 06:11

## 2023-09-13 RX ADMIN — Medication 325 MILLIGRAM(S): at 17:38

## 2023-09-13 RX ADMIN — Medication 650 MILLIGRAM(S): at 21:54

## 2023-09-13 RX ADMIN — CEFTRIAXONE 100 MILLIGRAM(S): 500 INJECTION, POWDER, FOR SOLUTION INTRAMUSCULAR; INTRAVENOUS at 17:38

## 2023-09-13 RX ADMIN — Medication 1 TABLET(S): at 11:36

## 2023-09-13 RX ADMIN — Medication 650 MILLIGRAM(S): at 06:11

## 2023-09-13 RX ADMIN — Medication 650 MILLIGRAM(S): at 20:54

## 2023-09-13 RX ADMIN — ONDANSETRON 4 MILLIGRAM(S): 8 TABLET, FILM COATED ORAL at 18:53

## 2023-09-13 RX ADMIN — Medication 650 MILLIGRAM(S): at 07:15

## 2023-09-13 NOTE — PATIENT PROFILE ADULT - OVER THE PAST TWO WEEKS HAVE YOU FELT DOWN, DEPRESSED OR HOPELESS?
After Visit Summary   7/17/2018    Holli Thacker    MRN: [id number]           Patient Information     Date Of Birth          1976        Visit Information        Provider Department      7/17/2018 3:00 PM [facility] 1 [facility]        Today's Diagnoses     Urticaria, idiopathic    -  1       Follow-ups after your visit        Who to contact     If you have questions or need follow up information about today's clinic visit or your schedule please contact [facility] directly at [phone].  Normal or non-critical lab and imaging results will be communicated to you by Naytevhart, letter or phone within 4 business days after the clinic has received the results. If you do not hear from us within 7 days, please contact the clinic through [employer] or phone. If you have a critical or abnormal lab result, we will notify you by phone as soon as possible.  Submit refill requests through [employer] or call your pharmacy and they will forward the refill request to us. Please allow 3 business days for your refill to be completed.          Additional Information About Your Visit        MyChart Information     [employer] gives you secure access to your electronic health record. If you see a primary care provider, you can also send messages to your care team and make appointments. If you have questions, please call your primary care clinic.  If you do not have a primary care provider, please call [phone] and they will assist you.        Care EveryWhere ID     This is your Care EveryWhere ID. This could be used by other organizations to access your [place] medical records  [id number]        Your Vitals Were     Pulse Temperature Respirations Pulse Oximetry          90 98.8  F (37.1  C) (Tympanic) 16 96%         Blood Pressure from Last 3 Encounters:   07/17/18 122/70   06/14/18 125/67   05/18/18 110/67    Weight from Last 3  Encounters:   03/15/18 74.8 kg (165 lb)   11/29/17 83.3 kg (183 lb 9.6 oz)   03/10/17 90.7 kg (200 lb)              Today, you had the following     No orders found for display       Primary Care Provider Office Phone # Fax #    Hansa Boles [phone]       [place] [address]        Equal Access to Services     SHANTA KAT : Hadii aad ku hadasho Soomaali, waaxda luqadaha, qaybta kaalmada adeegyada, waxay idiin hayaan adeeg woodroseannekriss reyes . So [facility] [phone].    ATENCIÓN: Si kristanla espcody, tiene a trinidad disposición servicios gratuitos de asistencia lingüística. [facility] [phone].    We comply with applicable federal civil rights laws and [place] laws. We do not discriminate on the basis of race, color, national origin, age, disability, sex, sexual orientation, or gender identity.            Thank you!     Thank you for choosing [facility]  for your care. Our goal is always to provide you with excellent care. Hearing back from our patients is one way we can continue to improve our services. Please take a few minutes to complete the written survey that you may receive in the mail after your visit with us. Thank you!             Your Updated Medication List - Protect others around you: Learn how to safely use, store and throw away your medicines at www.disposemymeds.org.          This list is accurate as of 7/17/18  4:27 PM.  Always use your most recent med list.                   Brand Name Dispense Instructions for use Diagnosis    albuterol (2.5 MG/3ML) 0.083% neb solution     1 Box    Take 1 vial (2.5 mg) by nebulization every 4 hours as needed for shortness of breath / dyspnea or wheezing        cyclobenzaprine 10 MG tablet    FLEXERIL     Take 10 mg by mouth At Bedtime        fluticasone 50 MCG/ACT spray    FLONASE     Spray 1 spray into both nostrils daily        fluticasone-salmeterol 500-50 MCG/DOSE  diskus inhaler    ADVAIR     Inhale 1 puff into the lungs 2 times daily        ibuprofen 200 MG tablet    ADVIL/MOTRIN    30 tablet    Take 1-2 tablets (200-400 mg) by mouth every 6 hours as needed for other (mild pain)    Post-op pain       ipratropium - albuterol 0.5 mg/2.5 mg/3 mL 0.5-2.5 (3) MG/3ML neb solution    DUONEB    1 Box    Take 1 vial (3 mLs) by nebulization 3 times daily        nortriptyline 50 MG capsule    PAMELOR     Take on capsule at betime        omalizumab 150 MG injection    XOLAIR     Inject 150 mg Subcutaneous See Admin Instructions        * oxyCODONE IR 5 MG tablet    ROXICODONE    15 tablet    Take 1-2 tablets (5-10 mg) by mouth every 4 hours as needed for pain        * oxyCODONE IR 5 MG tablet    ROXICODONE    10 tablet    Take 1-2 tablets (5-10 mg) by mouth every 3 hours as needed for other (Moderate to Severe Pain)    Post-op pain       pantoprazole 40 MG EC tablet    PROTONIX     Take 40 mg by mouth every morning        SINGULAIR PO      Take 10 mg by mouth At Bedtime        Vitamin D3 3000 units Tabs      Take 5,000 Units by mouth daily        XELJANZ 5 MG tablet   Generic drug:  tofacitinib      Take 5 mg by mouth 2 times daily        ZOLOFT PO      Take 200 mg by mouth At Bedtime        ZOMIG PO      Take 5 mg by mouth at onset of headache for migraine        ZONEGRAN PO      Take 200 mg by mouth At Bedtime        * Notice:  This list has 2 medication(s) that are the same as other medications prescribed for you. Read the directions carefully, and ask your doctor or other care provider to review them with you.       no

## 2023-09-13 NOTE — DISCHARGE NOTE PROVIDER - NSDCFUADDAPPT_GEN_ALL_CORE_FT
Please follow up with your OBGYN at [facility] clinic on 9/27/2023 at 9 am.   Plan to obtain nuchal Ultrasound.   Clinic located at:   [address] [place] [address], [place] [address]  Phone number: [phone]

## 2023-09-13 NOTE — PROGRESS NOTE ADULT - PROBLEM SELECTOR PLAN 1
Concern for pyelonephritis given +UA, +CV tenderness L>R, pt endorsed hx of prior pyelo in the setting of pregnancy confirmed on TVUS. Urine cultures sent and growing G- rods, awaiting final culture results and sensitivities. Does not meet SIRS criteria. No n/v/d, no vag discharge/bleeding.    - Empiric CTX 1g IV QD  - Awaiting blood culture final results/sensitivities to further tailor abx regimen  - Renal US to assess for pyelo/stones given CT contraindicated i.s.o. pregnancy        If confirmed to be GBS recurrent infection, pt should receive intrapartum chemoprophylaxis at the time of delivery to prevent  infection after discharge if she decides to proceed with pregnancy

## 2023-09-13 NOTE — DISCHARGE NOTE PROVIDER - CARE PROVIDER_API CALL
Mahad Lindsey  Obstetrics and Gynecology  [address] [place] [address]  Phone: [phone]  Fax: [phone]  Follow Up Time: 2 weeks

## 2023-09-13 NOTE — DISCHARGE NOTE PROVIDER - NSDCMRMEDTOKEN_GEN_ALL_CORE_FT
metFORMIN 500 mg oral tablet: 1 tab(s) orally every 12 hours   ferrous sulfate 325 mg (65 mg elemental iron) oral tablet: 1 tab(s) orally once a day  Macrobid 100 mg oral capsule: 1 cap(s) orally 2 times a day Start tomorrow  metFORMIN 500 mg oral tablet: 1 tab(s) orally every 12 hours

## 2023-09-13 NOTE — DISCHARGE NOTE PROVIDER - HOSPITAL COURSE
Hillary Ramey is a 33 year old Female w/ PMH T2DM,  w/ LMP in July who p.w. a 2 day hx of bilateral flank pain (L>R), dysuria and orange-tinged urine, admitted to the medicine service for workup of UTI and to r/o pyelonephritis i.s.o. confirmed pregnancy.    Hospital Course by Problem List:    #Urinary Tract Infection  Suspected due to flank pain, hx of UTIs, dysuria. UA further suggested UTI, culture confirmed E. coli. Retroperitoneal US was ordered which was not suspicious for pyelonephritis.    Plan:   - Received 2 days of IV Ceftriaxone 1g in hospital  - Continue with PO ____ for 5 additional days (total 7 day course)    #Iron Deficiency Anemia   Pt presented with Hgb 8.5, MCV 63.6 in setting of pregnancy, known past hx of iron deficiency anemia w/ rx PO iron (per pt). Labs confirmed TAMMIE. Started on PO iron, with plan to begin IV iron transfusions during the 2nd trimester per OB recs. No active bleeding noted, LMP July.    Plan:   - C.w. Ferrous Sulfate 325 mg PO  - F/u during OB/prenatal visits re: switch to IV iron in 2nd trimester    #Vargas gestation with third pregnancy.   OB consulted during admission, TVUS confirmed single, viable, intrauterine gestation with an estimated age of 11 wk 4 day. No acute OB concerns.    Plan:   - Follow up at [facility] clinic in 2 weeks for nuchal US  - A+ blood type, rhogam not indicated at this time    #T2DM (type 2 diabetes mellitus).   Was on [facility] inpatient, glucose well controlled during admission    Plan:   C.w. home Metformin 500 Q12 on discharge    Exam on Discharge:  General: alert, oriented, NAD  HEENT: EOMI, PERRL, MMM, neck supple  CV: RRR, nl s1/s2, no m/r/g  Pulmonary: CTA w/o w/r/r  GI: SNTND, +bowel sounds  : CVA tenderness on L side, mild/resolved by   Ext: WWP, non-erythematous/edematous   Hillary Ramey is a 33 year old Female w/ PMH T2DM,  w/ LMP in July who p.w. a 2 day hx of bilateral flank pain (L>R), dysuria and orange-tinged urine, admitted to the medicine service for workup of UTI and to r/o pyelonephritis i.s.o. confirmed pregnancy.    Hospital Course by Problem List:    #Urinary Tract Infection  Suspected due to flank pain, hx of UTIs, dysuria. UA further suggested UTI, culture confirmed E. coli. Retroperitoneal US was ordered which was not suspicious for pyelonephritis.    Plan:   - Received 2 days of IV Ceftriaxone 1g in hospital  - Continue with PO Nitrofurantoin 100 mg q12 for 5 additional days (total 7 day course)    #Iron Deficiency Anemia   Pt presented with Hgb 8.5, MCV 63.6 in setting of pregnancy, known past hx of iron deficiency anemia w/ rx PO iron (per pt). Labs confirmed TAMMIE. Started on PO iron, with plan to begin IV iron transfusions during the 2nd trimester per OB recs. No active bleeding noted, LMP July.    Plan:   - C.w. Ferrous Sulfate 325 mg PO  - F/u during OB/prenatal visits re: switch to IV iron in 2nd trimester    #Vargas gestation with third pregnancy.   OB consulted during admission, TVUS confirmed single, viable, intrauterine gestation with an estimated age of 11 wk 4 day. No acute OB concerns.    Plan:   - Follow up at [facility] clinic in 2 weeks for nuchal US  - A+ blood type, rhogam not indicated at this time    #T2DM (type 2 diabetes mellitus).   Was on [facility] inpatient, glucose well controlled during admission    Plan:   C.w. home Metformin 500 Q12 on discharge    Exam on Discharge:  General: alert, oriented, NAD  HEENT: EOMI, PERRL, MMM, neck supple  CV: RRR, nl s1/s2, no m/r/g  Pulmonary: CTA w/o w/r/r  GI: SNTND, +bowel sounds  : CVA tenderness on L side, mild/resolved by   Ext: WWP, non-erythematous/edematous   Hillary Ramey is a 33 year old Female w/ PMH T2DM,  w/ LMP in July who p.w. a 2 day hx of bilateral flank pain (L>R), dysuria and orange-tinged urine, admitted to the medicine service for workup of UTI and to r/o pyelonephritis i.s.o. confirmed pregnancy.    Hospital Course by Problem List:    #Urinary Tract Infection  Suspected due to flank pain, hx of UTIs, dysuria. UA further suggested UTI, culture confirmed E. coli. Retroperitoneal US was ordered which was not suspicious for pyelonephritis.  - Received 2 days of IV Ceftriaxone 1g in hospital  - Continue with PO Nitrofurantoin 100 mg q12 for 4 additional days (total 7 day course)    #Iron Deficiency Anemia   Pt presented with Hgb 8.5, MCV 63.6 in setting of pregnancy, known past hx of iron deficiency anemia w/ rx PO iron (per pt). Labs confirmed TAMMIE. Started on PO iron, with plan to begin IV iron transfusions during the 2nd trimester per OB recs. No active bleeding noted, LMP July.  - C.w. Ferrous Sulfate 325 mg PO  - F/u during OB/prenatal visits re: switch to IV iron in 2nd trimester    #Vargas gestation with third pregnancy.   OB consulted during admission, TVUS confirmed single, viable, intrauterine gestation with an estimated age of 11 wk 4 day. No acute OB concerns.  - Follow up at [facility] clinic in 2 weeks for nuchal US  - A+ blood type, rhogam not indicated at this time    #T2DM (type 2 diabetes mellitus).   Was on [facility] inpatient, glucose well controlled during admission  C.w. home Metformin 500 Q12 on discharge    Exam on Discharge:  General: alert, oriented, NAD  HEENT: EOMI, PERRL, MMM, neck supple  CV: RRR, nl s1/s2, no m/r/g  Pulmonary: CTA w/o w/r/r  GI: SNTND, +bowel sounds  : CVA tenderness on L side, mild/resolved by   Ext: WWP, non-erythematous/edematous

## 2023-09-13 NOTE — CONSULT NOTE ADULT - SUBJECTIVE AND OBJECTIVE BOX
34yo  at 11w4d by US dating admitted to Medicine for UTI w/ flank pain.  Pt was admitted overnight after coming into the ED with complaints of hematuria, nausea and urgency/frequency.   Pt states she has been hospitalized for pyelonephritis in the past-- last time being 2023.   Pt denies fever, chills, chest pain, SOB, abdominal pain, nausea, vomiting, vaginal bleeding      OB/GYN Hx: G1:  , G2:    Last PAP smear: abnormal papsmears, last colpo 8 months ago normal.     PMHx: DM2  SHx: none  Meds: Ibuprofen PRN for pain at home (did not know she was pregnant)   Allergies: NKDA, Shellfish     Social hx: 1 sexual partner w/ partner for 1 y, had sex 2d ago.     PHYSICAL EXAM:   Vital Signs Last 24 Hrs  T(C): 37.2 (13 Sep 2023 12:16), Max: 37.7 (12 Sep 2023 16:32)  T(F): 98.9 (13 Sep 2023 12:16), Max: 99.8 (12 Sep 2023 16:32)  HR: 79 (13 Sep 2023 12:16) (73 - 102)  BP: 121/72 (13 Sep 2023 12:16) (103/70 - 128/86)  BP(mean): --  RR: 18 (13 Sep 2023 12:16) (17 - 19)  SpO2: 97% (13 Sep 2023 12:16) (96% - 100%)    Parameters below as of 13 Sep 2023 12:16  Patient On (Oxygen Delivery Method): room air        **************************  Constitutional: Alert & Oriented x3, No acute distress  Respiratory: Clear to ausculation bilaterally; no wheezing, rhonchi, or crackles  Cardiovascular: regular rate and rhythm, no murmurs, or gallops  Gastrointestinal: soft, non tender, positive bowel sounds, no rebound or guarding   Pelvic exam:   Extremities: no calf tenderness or swelling    LABS:                        7.8    7.83  )-----------( 318      ( 13 Sep 2023 05:30 )             25.5     09-13    136  |  106  |  5<L>  ----------------------------<  96  4.0   |  21<L>  |  0.55    Ca    8.4      13 Sep 2023 05:30  Phos  3.7       Mg     1.7         TPro  6.2  /  Alb  3.2<L>  /  TBili  <0.2  /  DBili  x   /  AST  9<L>  /  ALT  7<L>  /  AlkPhos  69        Urinalysis Basic - ( 13 Sep 2023 05:30 )    Color: x / Appearance: x / SG: x / pH: x  Gluc: 96 mg/dL / Ketone: x  / Bili: x / Urobili: x   Blood: x / Protein: x / Nitrite: x   Leuk Esterase: x / RBC: x / WBC x   Sq Epi: x / Non Sq Epi: x / Bacteria: x      HCG Quantitative, Serum: 981104 mIU/mL ( @ 17:32)      RADIOLOGY & ADDITIONAL STUDIES:    Retropertioneal US    IMPRESSION:    No sonographic evidence of pyelonephritis. No acute findings.      TVUS : IMPRESSION:  1.  Single viable intrauterine gestation with average ultrasound age of   11 weeks and 4 days.    2.  The yolk sac is upper limits normal in size. An enlarged yolk sac is   a finding that is suspicious for pregnancy failure. Recommend correlation   with serial serum beta-hCG levels and follow-up pelvic ultrasound exam in   two weeks to ensure continued viability.     34yo  at 11w4d by US dating admitted to Medicine for UTI w/ flank pain.  Pt was admitted overnight after coming into the ED with complaints of hematuria, nausea and urgency/frequency.   Pt states she has been hospitalized for pyelonephritis in the past-- last time being 2023.   Pt denies fever, chills, chest pain, SOB, abdominal pain, nausea, vomiting, vaginal bleeding      OB/GYN Hx: G1:  , G2:    Last PAP smear: abnormal papsmears, last colpo 8 months ago normal.     PMHx: DM2  SHx: none  Meds: Ibuprofen PRN for pain at home (did not know she was pregnant)   Allergies: NKDA, Shellfish     Social hx: 1 sexual partner w/ partner for 1 y, had sex 2d ago.     PHYSICAL EXAM:   Vital Signs Last 24 Hrs  T(C): 37.2 (13 Sep 2023 12:16), Max: 37.7 (12 Sep 2023 16:32)  T(F): 98.9 (13 Sep 2023 12:16), Max: 99.8 (12 Sep 2023 16:32)  HR: 79 (13 Sep 2023 12:16) (73 - 102)  BP: 121/72 (13 Sep 2023 12:16) (103/70 - 128/86)  BP(mean): --  RR: 18 (13 Sep 2023 12:16) (17 - 19)  SpO2: 97% (13 Sep 2023 12:16) (96% - 100%)    Parameters below as of 13 Sep 2023 12:16  Patient On (Oxygen Delivery Method): room air    No abdominal tenderness on palpation.  No CVA tenderness.  Normal physiologic vaginal discharge on pelvic exam.     **************************  Constitutional: Alert & Oriented x3, No acute distress  Respiratory: Clear to ausculation bilaterally; no wheezing, rhonchi, or crackles  Cardiovascular: regular rate and rhythm, no murmurs, or gallops  Gastrointestinal: soft, non tender, positive bowel sounds, no rebound or guarding   Pelvic exam:   Extremities: no calf tenderness or swelling    LABS:                        7.8    7.83  )-----------( 318      ( 13 Sep 2023 05:30 )             25.5     09-13    136  |  106  |  5<L>  ----------------------------<  96  4.0   |  21<L>  |  0.55    Ca    8.4      13 Sep 2023 05:30  Phos  3.7       Mg     1.7         TPro  6.2  /  Alb  3.2<L>  /  TBili  <0.2  /  DBili  x   /  AST  9<L>  /  ALT  7<L>  /  AlkPhos  69        Urinalysis Basic - ( 13 Sep 2023 05:30 )    Color: x / Appearance: x / SG: x / pH: x  Gluc: 96 mg/dL / Ketone: x  / Bili: x / Urobili: x   Blood: x / Protein: x / Nitrite: x   Leuk Esterase: x / RBC: x / WBC x   Sq Epi: x / Non Sq Epi: x / Bacteria: x      HCG Quantitative, Serum: 339598 mIU/mL ( @ 17:32)      RADIOLOGY & ADDITIONAL STUDIES:    Retropertioneal US    IMPRESSION:    No sonographic evidence of pyelonephritis. No acute findings.      TVUS : IMPRESSION:  1.  Single viable intrauterine gestation with average ultrasound age of   11 weeks and 4 days.    2.  The yolk sac is upper limits normal in size. An enlarged yolk sac is   a finding that is suspicious for pregnancy failure. Recommend correlation   with serial serum beta-hCG levels and follow-up pelvic ultrasound exam in   two weeks to ensure continued viability.     34yo  at 11w4d by US dating admitted to Medicine for UTI w/ flank pain.  Pt was admitted overnight after coming into the ED with complaints of hematuria, nausea and urgency/frequency.   Pt states she has been hospitalized for pyelonephritis in the past-- last time being 2023.  Prior to this admission pt was unaware she was pregnant and is a poor historian on her menstrual cycle. Pt states menses is irregular and came twice in May but thinks it may have came last  but is not positive. Pt denies vaginal bleeding and abdominal cramping as of recent. Pt denies fever, chills, chest pain, SOB, abdominal pain, nausea, vomiting, vaginal bleeding      OB/GYN Hx: G1:  , G2:    Last PAP smear: abnormal papsmears, last colpo 8 months ago normal.     PMHx: DM2  SHx: none  Meds: Ibuprofen PRN for pain at home (did not know she was pregnant)   Allergies: NKDA, Shellfish     Social hx: 1 sexual partner w/ partner for 1 y, had sex 2d ago.     PHYSICAL EXAM:   Vital Signs Last 24 Hrs  T(C): 37.2 (13 Sep 2023 12:16), Max: 37.7 (12 Sep 2023 16:32)  T(F): 98.9 (13 Sep 2023 12:16), Max: 99.8 (12 Sep 2023 16:32)  HR: 79 (13 Sep 2023 12:16) (73 - 102)  BP: 121/72 (13 Sep 2023 12:16) (103/70 - 128/86)  BP(mean): --  RR: 18 (13 Sep 2023 12:16) (17 - 19)  SpO2: 97% (13 Sep 2023 12:16) (96% - 100%)    Parameters below as of 13 Sep 2023 12:16  Patient On (Oxygen Delivery Method): room air    No abdominal tenderness on palpation.  No CVA tenderness.  Normal physiologic vaginal discharge on pelvic exam.     **************************  Constitutional: Alert & Oriented x3, No acute distress  Respiratory: Clear to ausculation bilaterally; no wheezing, rhonchi, or crackles  Cardiovascular: regular rate and rhythm, no murmurs, or gallops  Gastrointestinal: soft, non tender, positive bowel sounds, no rebound or guarding   Pelvic exam:   Extremities: no calf tenderness or swelling    LABS:                        7.8    7.83  )-----------( 318      ( 13 Sep 2023 05:30 )             25.5         136  |  106  |  5<L>  ----------------------------<  96  4.0   |  21<L>  |  0.55    Ca    8.4      13 Sep 2023 05:30  Phos  3.7       Mg     1.7         TPro  6.2  /  Alb  3.2<L>  /  TBili  <0.2  /  DBili  x   /  AST  9<L>  /  ALT  7<L>  /  AlkPhos  69        Urinalysis Basic - ( 13 Sep 2023 05:30 )    Color: x / Appearance: x / SG: x / pH: x  Gluc: 96 mg/dL / Ketone: x  / Bili: x / Urobili: x   Blood: x / Protein: x / Nitrite: x   Leuk Esterase: x / RBC: x / WBC x   Sq Epi: x / Non Sq Epi: x / Bacteria: x      HCG Quantitative, Serum: 120276 mIU/mL ( @ 17:32)      RADIOLOGY & ADDITIONAL STUDIES:    Retropertioneal US    IMPRESSION:    No sonographic evidence of pyelonephritis. No acute findings.      TVUS : IMPRESSION:  1.  Single viable intrauterine gestation with average ultrasound age of   11 weeks and 4 days.    2.  The yolk sac is upper limits normal in size. An enlarged yolk sac is   a finding that is suspicious for pregnancy failure. Recommend correlation   with serial serum beta-hCG levels and follow-up pelvic ultrasound exam in   two weeks to ensure continued viability.

## 2023-09-13 NOTE — PATIENT PROFILE ADULT - FALL HARM RISK - UNIVERSAL INTERVENTIONS
Bed in lowest position, wheels locked, appropriate side rails in place/Call bell, personal items and telephone in reach/Instruct patient to call for assistance before getting out of bed or chair/Non-slip footwear when patient is out of bed/[place] to call system/Physically safe environment - no spills, clutter or unnecessary equipment/Purposeful Proactive Rounding/Room/bathroom lighting operational, light cord in reach

## 2023-09-13 NOTE — PROGRESS NOTE ADULT - PROBLEM SELECTOR PLAN 2
Pt presented w/ Hgb 8.5, MCV 63.6. Now Hgb 7.8, Hct 25.5%, MCV 63.4. Likely iron deficiency anemia given microcytosis, hypochromia, elevated RDW. Following iron labs. Active infection so avoiding IV iron, likely will need outpt iron therapy f/u w/ heme/onc. No active bleeding noted, LMP July given pregnancy. Pt states she was previously diagnosed with TAMMIE and took iron pills.    - F/u iron panel, reticulocyte count to further assess for TAMMIE  - F/u hemolysis labs to rule out hemolytic anemia  - Avoiding Iron transfusion i.s.o. active infection  - Likely will need f/u iron transfusion as outpatient w/ heme/onc  - Maintain active T&S, transfuse for Hgb < 7

## 2023-09-13 NOTE — DISCHARGE NOTE PROVIDER - NSDCCPCAREPLAN_GEN_ALL_CORE_FT
PRINCIPAL DISCHARGE DIAGNOSIS  Diagnosis: Urinary tract infection  Assessment and Plan of Treatment: Urinary tract infections, also called "UTIs," are infections that affect either the bladder or the kidneys. Bladder infections are more common than kidney infections. Bladder infections happen when bacteria get into the urethra and travel up into the bladder. Kidney infections happen when the bacteria travel even higher, up into the kidneys. The symptoms of a bladder infection include pain or a burning feeling when you urinate, the need to urinate often, the need to urinate suddenly or in a hurry, blood in the urine. Signs that an infection has spread to the kidneys include fever, back pain, or nausea/vomiting. It is important that you take your antibiotics as prescribed and to completion to properly treat your urinary tract infection and prevent antibiotic resistance.  Please be sure to follow up with your PCP and OBGYN        SECONDARY DISCHARGE DIAGNOSES  Diagnosis: Pregnancy  Assessment and Plan of Treatment: Please follow up with an OBGYN in 2 weeks after discharge.     PRINCIPAL DISCHARGE DIAGNOSIS  Diagnosis: Urinary tract infection  Assessment and Plan of Treatment: Urinary tract infections, also called "UTIs," are infections that affect either the bladder or the kidneys. Bladder infections are more common than kidney infections. Bladder infections happen when bacteria get into the urethra and travel up into the bladder. Kidney infections happen when the bacteria travel even higher, up into the kidneys. The symptoms of a bladder infection include pain or a burning feeling when you urinate, the need to urinate often, the need to urinate suddenly or in a hurry, blood in the urine. Signs that an infection has spread to the kidneys include fever, back pain, or nausea/vomiting. It is important that you take your antibiotics as prescribed and to completion to properly treat your urinary tract infection and prevent antibiotic resistance.  Please be sure to follow up with your PCP and OBGYN        SECONDARY DISCHARGE DIAGNOSES  Diagnosis: Pregnancy  Assessment and Plan of Treatment: Please follow up with an OBGYN in 2 weeks after discharge.    Diagnosis: Anemia  Assessment and Plan of Treatment: Continue taking your PO ferrous sulfate tablets daily, follow-up with your OBGYN regarding any need for switch to IV iron.

## 2023-09-13 NOTE — PROGRESS NOTE ADULT - SUBJECTIVE AND OBJECTIVE BOX
OVERNIGHT EVENTS: Admitted last night. NAEON    SUBJECTIVE / INTERVAL HPI: Patient seen and examined at bedside. Awake and alert. Endorses continued flank pain on the Left side. States she has urinated and the color is no longer pink/orange, dysuria mostly resolved. She states she also has some pain around the pubic area.    Continues to deny any n/v/d, vaginal discharge/bleeding.    VITAL SIGNS:  Vital Signs Last 24 Hrs  T(C): 36.9 (13 Sep 2023 05:54), Max: 37.7 (12 Sep 2023 16:32)  T(F): 98.4 (13 Sep 2023 05:54), Max: 99.8 (12 Sep 2023 16:32)  HR: 75 (13 Sep 2023 05:54) (73 - 102)  BP: 105/67 (13 Sep 2023 05:54) (103/70 - 128/86)  BP(mean): --  RR: 19 (13 Sep 2023 05:54) (17 - 19)  SpO2: 97% (13 Sep 2023 05:54) (96% - 100%)    Parameters below as of 13 Sep 2023 05:54  Patient On (Oxygen Delivery Method): room air      I&O's Summary      PHYSICAL EXAM:    General: Alert, conversational. NAD  HEENT: NC/AT; PERRL, anicteric sclera; MMM  Neck: supple  Cardiovascular: +S1/S2; RRR  Respiratory: CTA B/L; no W/R/R  Gastrointestinal: soft, NT/ND; +BSx4.  Genitourinary: +CVA tenderness, L sided;  exam otherwise deferred  Extremities: WWP; no edema, clubbing or cyanosis  Vascular: 2+ radial, DP/PT pulses B/L  Neurological: AAOx3; no focal deficits    MEDICATIONS:  MEDICATIONS  (STANDING):  cefTRIAXone   IVPB 1000 milliGRAM(s) IV Intermittent every 24 hours  dextrose 5%. 1000 milliLiter(s) (50 mL/Hr) IV Continuous <Continuous>  dextrose 5%. 1000 milliLiter(s) (100 mL/Hr) IV Continuous <Continuous>  dextrose 50% Injectable 25 Gram(s) IV Push once  dextrose 50% Injectable 12.5 Gram(s) IV Push once  dextrose 50% Injectable 25 Gram(s) IV Push once  glucagon  Injectable 1 milliGRAM(s) IntraMuscular once  influenza   Vaccine 0.5 milliLiter(s) IntraMuscular once  insulin lispro (ADMELOG) corrective regimen sliding scale   SubCutaneous Before meals and at bedtime  prenatal multivitamin 1 Tablet(s) Oral daily    MEDICATIONS  (PRN):  acetaminophen     Tablet .. 650 milliGRAM(s) Oral every 6 hours PRN Temp greater or equal to 38C (100.4F), Mild Pain (1 - 3)  aluminum hydroxide/magnesium hydroxide/simethicone Suspension 30 milliLiter(s) Oral every 4 hours PRN Dyspepsia  dextrose Oral Gel 15 Gram(s) Oral once PRN Blood Glucose LESS THAN 70 milliGRAM(s)/deciliter  melatonin 3 milliGRAM(s) Oral at bedtime PRN Insomnia  ondansetron Injectable 4 milliGRAM(s) IV Push every 8 hours PRN Nausea and/or Vomiting      ALLERGIES:  Allergies    shellfish (Unknown)  No Known Drug Allergies    Intolerances        LABS:                        7.8    7.83  )-----------( 318      ( 13 Sep 2023 05:30 )             25.5     09-13    136  |  106  |  5<L>  ----------------------------<  96  4.0   |  21<L>  |  0.55    Ca    8.4      13 Sep 2023 05:30  Phos  3.7     09-13  Mg     1.7     09-13    TPro  6.2  /  Alb  3.2<L>  /  TBili  <0.2  /  DBili  x   /  AST  9<L>  /  ALT  7<L>  /  AlkPhos  69  09-13      Urinalysis Basic - ( 13 Sep 2023 05:30 )    Color: x / Appearance: x / SG: x / pH: x  Gluc: 96 mg/dL / Ketone: x  / Bili: x / Urobili: x   Blood: x / Protein: x / Nitrite: x   Leuk Esterase: x / RBC: x / WBC x   Sq Epi: x / Non Sq Epi: x / Bacteria: x      CAPILLARY BLOOD GLUCOSE      POCT Blood Glucose.: 97 mg/dL (13 Sep 2023 09:28)      RADIOLOGY & ADDITIONAL TESTS: Reviewed.< from: US Transvaginal, OB (09.12.23 @ 18:50) >  IMPRESSION:  1.  Single viable intrauterine gestation with average ultrasound age of   11 weeks and 4 days.    2.  The yolk sac is upper limits normal in size. An enlarged yolk sac is   a finding that is suspicious for pregnancy failure. Recommend correlation   with serial serum beta-hCG levels and follow-up pelvic ultrasound exam in   two weeks to ensure continued viability.    < end of copied text >

## 2023-09-14 ENCOUNTER — TRANSCRIPTION ENCOUNTER (OUTPATIENT)
Age: 33
End: 2023-09-14

## 2023-09-14 VITALS
OXYGEN SATURATION: 98 % | SYSTOLIC BLOOD PRESSURE: 113 MMHG | RESPIRATION RATE: 17 BRPM | DIASTOLIC BLOOD PRESSURE: 75 MMHG | TEMPERATURE: 99 F | HEART RATE: 80 BPM

## 2023-09-14 DIAGNOSIS — N39.0 URINARY TRACT INFECTION, SITE NOT SPECIFIED: ICD-10-CM

## 2023-09-14 LAB
-  AMPICILLIN/SULBACTAM: SIGNIFICANT CHANGE UP
-  AMPICILLIN: SIGNIFICANT CHANGE UP
-  CEFAZOLIN: SIGNIFICANT CHANGE UP
-  CEFTRIAXONE: SIGNIFICANT CHANGE UP
-  CIPROFLOXACIN: SIGNIFICANT CHANGE UP
-  ERTAPENEM: SIGNIFICANT CHANGE UP
-  GENTAMICIN: SIGNIFICANT CHANGE UP
-  NITROFURANTOIN: SIGNIFICANT CHANGE UP
-  PIPERACILLIN/TAZOBACTAM: SIGNIFICANT CHANGE UP
-  TOBRAMYCIN: SIGNIFICANT CHANGE UP
-  TRIMETHOPRIM/SULFAMETHOXAZOLE: SIGNIFICANT CHANGE UP
CULTURE RESULTS: SIGNIFICANT CHANGE UP
GLUCOSE BLDC GLUCOMTR-MCNC: 90 MG/DL — SIGNIFICANT CHANGE UP (ref 70–99)
GLUCOSE BLDC GLUCOMTR-MCNC: 96 MG/DL — SIGNIFICANT CHANGE UP (ref 70–99)
HCT VFR BLD CALC: 27.1 % — LOW (ref 34.5–45)
HGB BLD-MCNC: 8.1 G/DL — LOW (ref 11.5–15.5)
MAGNESIUM SERPL-MCNC: 1.7 MG/DL — SIGNIFICANT CHANGE UP (ref 1.6–2.6)
MCHC RBC-ENTMCNC: 19.4 PG — LOW (ref 27–34)
MCHC RBC-ENTMCNC: 29.9 GM/DL — LOW (ref 32–36)
MCV RBC AUTO: 65 FL — LOW (ref 80–100)
METHOD TYPE: SIGNIFICANT CHANGE UP
NRBC # BLD: 0 /100 WBCS — SIGNIFICANT CHANGE UP (ref 0–0)
ORGANISM # SPEC MICROSCOPIC CNT: SIGNIFICANT CHANGE UP
ORGANISM # SPEC MICROSCOPIC CNT: SIGNIFICANT CHANGE UP
PHOSPHATE SERPL-MCNC: 2.6 MG/DL — SIGNIFICANT CHANGE UP (ref 2.5–4.5)
PLATELET # BLD AUTO: 347 K/UL — SIGNIFICANT CHANGE UP (ref 150–400)
RBC # BLD: 4.17 M/UL — SIGNIFICANT CHANGE UP (ref 3.8–5.2)
RBC # FLD: 21.1 % — HIGH (ref 10.3–14.5)
SPECIMEN SOURCE: SIGNIFICANT CHANGE UP
WBC # BLD: 8.64 K/UL — SIGNIFICANT CHANGE UP (ref 3.8–10.5)
WBC # FLD AUTO: 8.64 K/UL — SIGNIFICANT CHANGE UP (ref 3.8–10.5)

## 2023-09-14 PROCEDURE — 87040 BLOOD CULTURE FOR BACTERIA: CPT

## 2023-09-14 PROCEDURE — 76817 TRANSVAGINAL US OBSTETRIC: CPT

## 2023-09-14 PROCEDURE — 83036 HEMOGLOBIN GLYCOSYLATED A1C: CPT

## 2023-09-14 PROCEDURE — 87086 URINE CULTURE/COLONY COUNT: CPT

## 2023-09-14 PROCEDURE — 86901 BLOOD TYPING SEROLOGIC RH(D): CPT

## 2023-09-14 PROCEDURE — 86900 BLOOD TYPING SEROLOGIC ABO: CPT

## 2023-09-14 PROCEDURE — 86780 TREPONEMA PALLIDUM: CPT

## 2023-09-14 PROCEDURE — 87186 SC STD MICRODIL/AGAR DIL: CPT

## 2023-09-14 PROCEDURE — 83615 LACTATE (LD) (LDH) ENZYME: CPT

## 2023-09-14 PROCEDURE — 83735 ASSAY OF MAGNESIUM: CPT

## 2023-09-14 PROCEDURE — 84702 CHORIONIC GONADOTROPIN TEST: CPT

## 2023-09-14 PROCEDURE — 76770 US EXAM ABDO BACK WALL COMP: CPT

## 2023-09-14 PROCEDURE — 81001 URINALYSIS AUTO W/SCOPE: CPT

## 2023-09-14 PROCEDURE — 85025 COMPLETE CBC W/AUTO DIFF WBC: CPT

## 2023-09-14 PROCEDURE — 76801 OB US < 14 WKS SINGLE FETUS: CPT

## 2023-09-14 PROCEDURE — 83605 ASSAY OF LACTIC ACID: CPT

## 2023-09-14 PROCEDURE — 82962 GLUCOSE BLOOD TEST: CPT

## 2023-09-14 PROCEDURE — 83010 ASSAY OF HAPTOGLOBIN QUANT: CPT

## 2023-09-14 PROCEDURE — 83540 ASSAY OF IRON: CPT

## 2023-09-14 PROCEDURE — 84466 ASSAY OF TRANSFERRIN: CPT

## 2023-09-14 PROCEDURE — 84100 ASSAY OF PHOSPHORUS: CPT

## 2023-09-14 PROCEDURE — 36415 COLL VENOUS BLD VENIPUNCTURE: CPT

## 2023-09-14 PROCEDURE — 82728 ASSAY OF FERRITIN: CPT

## 2023-09-14 PROCEDURE — 99239 HOSP IP/OBS DSCHRG MGMT >30: CPT | Mod: GC

## 2023-09-14 PROCEDURE — 96374 THER/PROPH/DIAG INJ IV PUSH: CPT

## 2023-09-14 PROCEDURE — 85045 AUTOMATED RETICULOCYTE COUNT: CPT

## 2023-09-14 PROCEDURE — 99285 EMERGENCY DEPT VISIT HI MDM: CPT | Mod: 25

## 2023-09-14 PROCEDURE — 80053 COMPREHEN METABOLIC PANEL: CPT

## 2023-09-14 PROCEDURE — 85027 COMPLETE CBC AUTOMATED: CPT

## 2023-09-14 PROCEDURE — 83550 IRON BINDING TEST: CPT

## 2023-09-14 PROCEDURE — 86850 RBC ANTIBODY SCREEN: CPT

## 2023-09-14 RX ORDER — NITROFURANTOIN MACROCRYSTAL 50 MG
1 CAPSULE ORAL
Qty: 8 | Refills: 0
Start: 2023-09-14 | End: 2023-09-17

## 2023-09-14 RX ORDER — FERROUS SULFATE 325(65) MG
1 TABLET ORAL
Qty: 30 | Refills: 0
Start: 2023-09-14 | End: 2023-10-13

## 2023-09-14 RX ADMIN — Medication 1 TABLET(S): at 11:33

## 2023-09-14 RX ADMIN — Medication 325 MILLIGRAM(S): at 11:33

## 2023-09-14 RX ADMIN — ONDANSETRON 4 MILLIGRAM(S): 8 TABLET, FILM COATED ORAL at 13:38

## 2023-09-14 RX ADMIN — CEFTRIAXONE 100 MILLIGRAM(S): 500 INJECTION, POWDER, FOR SOLUTION INTRAMUSCULAR; INTRAVENOUS at 17:04

## 2023-09-14 NOTE — PROGRESS NOTE ADULT - SUBJECTIVE AND OBJECTIVE BOX
OVERNIGHT EVENTS:    SUBJECTIVE / INTERVAL HPI: Patient seen and examined at bedside.     VITAL SIGNS:  Vital Signs Last 24 Hrs  T(C): 37 (14 Sep 2023 05:00), Max: 37.2 (13 Sep 2023 12:16)  T(F): 98.6 (14 Sep 2023 05:00), Max: 98.9 (13 Sep 2023 12:16)  HR: 71 (14 Sep 2023 05:00) (65 - 79)  BP: 106/73 (14 Sep 2023 05:00) (106/73 - 121/72)  BP(mean): --  RR: 17 (14 Sep 2023 05:00) (17 - 18)  SpO2: 97% (14 Sep 2023 05:00) (97% - 99%)    Parameters below as of 14 Sep 2023 05:00  Patient On (Oxygen Delivery Method): room air        PHYSICAL EXAM:    General: NAD  HEENT: NC/AT; PERRL, anicteric sclera; MMM  Neck: supple w/o palpable nodularity  Cardiovascular: +S1/S2; RRR  Respiratory: CTA B/L; no W/R/R  Gastrointestinal: soft, NT/ND; +BSx4  Extremities: WWP; no edema, clubbing or cyanosis  Vascular: 2+ radial, DP/PT pulses B/L  Neurological: AAOx3; no focal deficits    MEDICATIONS:  MEDICATIONS  (STANDING):  cefTRIAXone   IVPB 1000 milliGRAM(s) IV Intermittent every 24 hours  dextrose 5%. 1000 milliLiter(s) (50 mL/Hr) IV Continuous <Continuous>  dextrose 5%. 1000 milliLiter(s) (100 mL/Hr) IV Continuous <Continuous>  dextrose 50% Injectable 25 Gram(s) IV Push once  dextrose 50% Injectable 12.5 Gram(s) IV Push once  dextrose 50% Injectable 25 Gram(s) IV Push once  ferrous    sulfate 325 milliGRAM(s) Oral daily  glucagon  Injectable 1 milliGRAM(s) IntraMuscular once  influenza   Vaccine 0.5 milliLiter(s) IntraMuscular once  insulin lispro (ADMELOG) corrective regimen sliding scale   SubCutaneous Before meals and at bedtime  prenatal multivitamin 1 Tablet(s) Oral daily    MEDICATIONS  (PRN):  acetaminophen     Tablet .. 650 milliGRAM(s) Oral every 6 hours PRN Temp greater or equal to 38C (100.4F), Mild Pain (1 - 3)  aluminum hydroxide/magnesium hydroxide/simethicone Suspension 30 milliLiter(s) Oral every 4 hours PRN Dyspepsia  dextrose Oral Gel 15 Gram(s) Oral once PRN Blood Glucose LESS THAN 70 milliGRAM(s)/deciliter  melatonin 3 milliGRAM(s) Oral at bedtime PRN Insomnia  ondansetron Injectable 4 milliGRAM(s) IV Push every 8 hours PRN Nausea and/or Vomiting      ALLERGIES:  Allergies    shellfish (Unknown)  No Known Drug Allergies    Intolerances        LABS:                        7.8    7.83  )-----------( 318      ( 13 Sep 2023 05:30 )             25.5     09-13    136  |  106  |  5<L>  ----------------------------<  96  4.0   |  21<L>  |  0.55    Ca    8.4      13 Sep 2023 05:30  Phos  3.7     09-13  Mg     1.7     09-13    TPro  6.2  /  Alb  3.2<L>  /  TBili  <0.2  /  DBili  x   /  AST  9<L>  /  ALT  7<L>  /  AlkPhos  69  09-13      Urinalysis Basic - ( 13 Sep 2023 05:30 )    Color: x / Appearance: x / SG: x / pH: x  Gluc: 96 mg/dL / Ketone: x  / Bili: x / Urobili: x   Blood: x / Protein: x / Nitrite: x   Leuk Esterase: x / RBC: x / WBC x   Sq Epi: x / Non Sq Epi: x / Bacteria: x      CAPILLARY BLOOD GLUCOSE      POCT Blood Glucose.: 101 mg/dL (13 Sep 2023 22:21)      RADIOLOGY & ADDITIONAL TESTS: Reviewed.   OVERNIGHT EVENTS: JB    SUBJECTIVE / INTERVAL HPI: Patient seen and examined at bedside. Reports her back pain is better today. Denies fever, chills, dysuria, hematuria, n/v/d.     VITAL SIGNS:  Vital Signs Last 24 Hrs  T(C): 37 (14 Sep 2023 05:00), Max: 37.2 (13 Sep 2023 12:16)  T(F): 98.6 (14 Sep 2023 05:00), Max: 98.9 (13 Sep 2023 12:16)  HR: 71 (14 Sep 2023 05:00) (65 - 79)  BP: 106/73 (14 Sep 2023 05:00) (106/73 - 121/72)  BP(mean): --  RR: 17 (14 Sep 2023 05:00) (17 - 18)  SpO2: 97% (14 Sep 2023 05:00) (97% - 99%)    Parameters below as of 14 Sep 2023 05:00  Patient On (Oxygen Delivery Method): room air        PHYSICAL EXAM:    General: Alert, conversational. NAD  HEENT: NC/AT; PERRL, anicteric sclera; MMM  Neck: supple  Cardiovascular: +S1/S2; RRR  Respiratory: CTA B/L; no W/R/R  Gastrointestinal: soft, NT/ND; +BSx4.  Genitourinary: +mild left CVA tenderness, NT/ND  Extremities: WWP; no edema, clubbing or cyanosis  Vascular: 2+ radial, DP/PT pulses B/L  Neurological: AAOx3; no focal deficits    MEDICATIONS:  MEDICATIONS  (STANDING):  cefTRIAXone   IVPB 1000 milliGRAM(s) IV Intermittent every 24 hours  dextrose 5%. 1000 milliLiter(s) (50 mL/Hr) IV Continuous <Continuous>  dextrose 5%. 1000 milliLiter(s) (100 mL/Hr) IV Continuous <Continuous>  dextrose 50% Injectable 25 Gram(s) IV Push once  dextrose 50% Injectable 12.5 Gram(s) IV Push once  dextrose 50% Injectable 25 Gram(s) IV Push once  ferrous    sulfate 325 milliGRAM(s) Oral daily  glucagon  Injectable 1 milliGRAM(s) IntraMuscular once  influenza   Vaccine 0.5 milliLiter(s) IntraMuscular once  insulin lispro (ADMELOG) corrective regimen sliding scale   SubCutaneous Before meals and at bedtime  prenatal multivitamin 1 Tablet(s) Oral daily    MEDICATIONS  (PRN):  acetaminophen     Tablet .. 650 milliGRAM(s) Oral every 6 hours PRN Temp greater or equal to 38C (100.4F), Mild Pain (1 - 3)  aluminum hydroxide/magnesium hydroxide/simethicone Suspension 30 milliLiter(s) Oral every 4 hours PRN Dyspepsia  dextrose Oral Gel 15 Gram(s) Oral once PRN Blood Glucose LESS THAN 70 milliGRAM(s)/deciliter  melatonin 3 milliGRAM(s) Oral at bedtime PRN Insomnia  ondansetron Injectable 4 milliGRAM(s) IV Push every 8 hours PRN Nausea and/or Vomiting      ALLERGIES:  Allergies    shellfish (Unknown)  No Known Drug Allergies    Intolerances        LABS:                        7.8    7.83  )-----------( 318      ( 13 Sep 2023 05:30 )             25.5     09-13    136  |  106  |  5<L>  ----------------------------<  96  4.0   |  21<L>  |  0.55    Ca    8.4      13 Sep 2023 05:30  Phos  3.7     09-13  Mg     1.7     09-13    TPro  6.2  /  Alb  3.2<L>  /  TBili  <0.2  /  DBili  x   /  AST  9<L>  /  ALT  7<L>  /  AlkPhos  69  09-13      Urinalysis Basic - ( 13 Sep 2023 05:30 )    Color: x / Appearance: x / SG: x / pH: x  Gluc: 96 mg/dL / Ketone: x  / Bili: x / Urobili: x   Blood: x / Protein: x / Nitrite: x   Leuk Esterase: x / RBC: x / WBC x   Sq Epi: x / Non Sq Epi: x / Bacteria: x      CAPILLARY BLOOD GLUCOSE      POCT Blood Glucose.: 101 mg/dL (13 Sep 2023 22:21)      RADIOLOGY & ADDITIONAL TESTS: Reviewed.

## 2023-09-14 NOTE — PROGRESS NOTE ADULT - ASSESSMENT
Hillary Ramey is a 33 year old  female (10 yo, 18 yo children) LMP July, pmh T2DM & recurrent kidney infections who presents with 2 day history of bilateral flank pain and dysuria. Admitted to medicine floor for workup of pyelonephritis i.s.o. 11 week pregnancy.
Hillary Ramey is a 33 year old  female (9 yo, 18 yo children) LMP July, pmh T2DM & recurrent kidney infections who presents with 2 day history of bilateral flank pain and dysuria. Admitted to medicine floor for workup of pyelonephritis i.s.o. 11 week pregnancy.

## 2023-09-14 NOTE — PROGRESS NOTE ADULT - PROBLEM SELECTOR PLAN 3
Pt , TVUS confirmed single, viable, intrauterine gestation with an estimated age of 11 wk 4 day. Yolk size at upper limit, some concern for impending pregnancy failure. Nl amniotic fluid, closed cervix 5.6 cm, + Doppler flow to ovaries b/l, no free fluid in cul-de-sac. No acute concerns such as ovarian torsion, ectopic pregnancy, etc. noted based on presentation and TVUS.    - Consulting OB/Gyn re: concern for pregnancy failure on TVUS, recs re: trending b-hCG, and to ensure non-teratogenicity of meds, appreciate recs  - Pt currently has no obstetrician, would like to follow-up with OB at [facility]
Pt presented w/ Hgb 8.5, MCV 63.6. Now Hgb 7.8, Hct 25.5%, MCV 63.4. Likely iron deficiency anemia given microcytosis, hypochromia, elevated RDW. Following iron labs. Active infection so avoiding IV iron, likely will need outpt iron therapy f/u w/ heme/onc. No active bleeding noted, LMP July given pregnancy. Pt states she was previously diagnosed with TAMMIE and took iron pills.  -Iron panel consistent with TAMMIE,     -Started on PO Iron daily.   -Likely will need f/u outpatient w/ heme/onc once infection resolves.   -Maintain active T&S, transfuse for Hgb < 7

## 2023-09-14 NOTE — PROGRESS NOTE ADULT - ATTENDING COMMENTS
33-year-old female with a PMHx of DMII who presented with UTI, found to be pregnant on admission.      #UTI  -continue with empiric CTX for now, plan for 7-day course of Abx   -UCx: E. coli, follow up sensitivities   -renal US negative for pyelonephritis     #Microcytic Anemia    -baseline Hgb appears to be around 8.5 - 10.5 that worsened 2/2 pregnancy   -follow up iron panel and hemolysis labs   -would benefit from outpatient oncology follow up for IV iron if found to be iron deficiency    #Pregnancy    -OBGYN consult
33-year-old female with a PMHx of DMII who presented with UTI, found to be pregnant on admission.       #UTI   -UCx: E. coli sensitive to nitrofurantoin   -renal US negative for pyelonephritis    -transition from CTX to Nitrofurantoin to complete 7-day course (end date: 9/18)      #Microcytic Anemia     -baseline Hgb appears to be around 8.5 - 10.5 that worsened 2/2 pregnancy    -start PO iron, OBGYN states no evidence to support IV iron in 1st trimester      #Pregnancy     -OBGYN consulted, outpatient follow up in 2 weeks     DVT PPx: none    Dispo: home

## 2023-09-14 NOTE — DISCHARGE NOTE NURSING/CASE MANAGEMENT/SOCIAL WORK - NSDCPEFALRISK_GEN_ALL_CORE
For information on Fall & Injury Prevention, visit: [email or web address] OR  [email or web address] OR  https://www.cdc.gov/steadi/patient.html

## 2023-09-14 NOTE — PROGRESS NOTE ADULT - PROBLEM SELECTOR PLAN 2
Less concern for pyelonephritis given negative abd u/s.   Though +UA, +CV tenderness L>R, pt endorsed hx of prior pyelo in the setting of pregnancy confirmed on TVUS. Urine cultures sent and growing E Coli. Does not meet SIRS criteria. No n/v/d, no vag discharge/bleeding.  Renal U/S: No sonographic evidence of pyelonephritis. No acute findings.    -Empiric CTX 1g IV QD  -F/u culture sensitivities.           If confirmed to be GBS recurrent infection, pt should receive intrapartum chemoprophylaxis at the time of delivery to prevent  infection after discharge if she decides to proceed with pregnancy Less concern for pyelonephritis given negative abd u/s.   Though +UA, +CV tenderness L>R, pt endorsed hx of prior pyelo in the setting of pregnancy confirmed on TVUS. Urine cultures sent and growing E Coli. Does not meet SIRS criteria. No n/v/d, no vag discharge/bleeding.  Renal U/S: No sonographic evidence of pyelonephritis. No acute findings.    -Empiric CTX 1g IV QD  -F/u culture sensitivities.

## 2023-09-14 NOTE — PROGRESS NOTE ADULT - PROBLEM SELECTOR PLAN 4
Pt , TVUS confirmed single, viable, intrauterine gestation with an estimated age of 11 wk 4 day. Yolk size at upper limit, some concern for impending pregnancy failure. Nl amniotic fluid, closed cervix 5.6 cm, + Doppler flow to ovaries b/l, no free fluid in cul-de-sac. No acute concerns such as ovarian torsion, ectopic pregnancy, etc. noted based on presentation and TVUS.    - Consulted OB/Gyn, appreciate recs  - follow-up with OBGYN outpatient in 2 weeks. Pt , TVUS confirmed single, viable, intrauterine gestation with an estimated age of 11 wk 4 day. Yolk size at upper limit, some concern for impending pregnancy failure. Nl amniotic fluid, closed cervix 5.6 cm, + Doppler flow to ovaries b/l, no free fluid in cul-de-sac. No acute concerns such as ovarian torsion, ectopic pregnancy, etc. noted based on presentation and TVUS.    - Consulted OB/Gyn, appreciate recs  - follow-up with OBGYN outpatient in 2 weeks

## 2023-09-14 NOTE — PROGRESS NOTE ADULT - PROBLEM SELECTOR PLAN 5
F: None  E: Replete PRN  N: Regular diet  DVT ppx: Lovenox  Code: Full
Home med Metformin 500 Q12    - Holding Metformin  - mISS while inpt

## 2023-09-14 NOTE — DISCHARGE NOTE NURSING/CASE MANAGEMENT/SOCIAL WORK - NSDCFUADDAPPT_GEN_ALL_CORE_FT
Please follow up with your OBGYN at [facility] clinic on 9/27/2023 at 9 am.   Plan to obtain nuchal Ultrasound.   Clinic located at:   [address] [place] [address], [place] [address]  Phone number: [phone]

## 2023-09-14 NOTE — DISCHARGE NOTE NURSING/CASE MANAGEMENT/SOCIAL WORK - PATIENT PORTAL LINK FT
You can access the FollowMyHealth Patient Portal offered by [facility] by registering at the following website: [email or web address]. By joining [facility]’s FollowMyHealth portal, you will also be able to view your health information using other applications (apps) compatible with our system.

## 2023-09-17 LAB
CULTURE RESULTS: SIGNIFICANT CHANGE UP
CULTURE RESULTS: SIGNIFICANT CHANGE UP
SPECIMEN SOURCE: SIGNIFICANT CHANGE UP
SPECIMEN SOURCE: SIGNIFICANT CHANGE UP

## 2023-09-20 DIAGNOSIS — O23.41 UNSPECIFIED INFECTION OF URINARY TRACT IN PREGNANCY, FIRST TRIMESTER: ICD-10-CM

## 2023-09-20 DIAGNOSIS — O99.011 ANEMIA COMPLICATING PREGNANCY, FIRST TRIMESTER: ICD-10-CM

## 2023-09-20 DIAGNOSIS — B96.20 UNSPECIFIED ESCHERICHIA COLI [E. COLI] AS THE CAUSE OF DISEASES CLASSIFIED ELSEWHERE: ICD-10-CM

## 2023-09-20 DIAGNOSIS — R31.9 HEMATURIA, UNSPECIFIED: ICD-10-CM

## 2023-09-20 DIAGNOSIS — Z91.013 ALLERGY TO SEAFOOD: ICD-10-CM

## 2023-09-20 DIAGNOSIS — D50.9 IRON DEFICIENCY ANEMIA, UNSPECIFIED: ICD-10-CM

## 2023-09-20 DIAGNOSIS — Z3A.11 11 WEEKS GESTATION OF PREGNANCY: ICD-10-CM

## 2023-09-20 DIAGNOSIS — Z87.891 PERSONAL HISTORY OF NICOTINE DEPENDENCE: ICD-10-CM

## 2023-09-20 DIAGNOSIS — O24.111 PRE-EXISTING TYPE 2 DIABETES MELLITUS, IN PREGNANCY, FIRST TRIMESTER: ICD-10-CM

## 2023-09-20 DIAGNOSIS — Z79.84 LONG TERM (CURRENT) USE OF ORAL HYPOGLYCEMIC DRUGS: ICD-10-CM

## 2023-10-03 ENCOUNTER — APPOINTMENT (OUTPATIENT)
Dept: OBGYN | Facility: CLINIC | Age: 33
End: 2023-10-03

## 2024-03-27 ENCOUNTER — RESULT REVIEW (OUTPATIENT)
Age: 34
End: 2024-03-27

## 2024-03-27 ENCOUNTER — INPATIENT (INPATIENT)
Facility: HOSPITAL | Age: 34
LOS: 1 days | Discharge: HOME | DRG: 776 | End: 2024-03-29
Attending: OBSTETRICS & GYNECOLOGY | Admitting: OBSTETRICS & GYNECOLOGY
Payer: COMMERCIAL

## 2024-03-27 VITALS
HEART RATE: 53 BPM | TEMPERATURE: 99 F | SYSTOLIC BLOOD PRESSURE: 148 MMHG | RESPIRATION RATE: 18 BRPM | WEIGHT: 229.94 LBS | HEIGHT: 66 IN | DIASTOLIC BLOOD PRESSURE: 91 MMHG | OXYGEN SATURATION: 96 %

## 2024-03-27 LAB
ALBUMIN SERPL ELPH-MCNC: 3.4 G/DL — SIGNIFICANT CHANGE UP (ref 3.3–5)
ALP SERPL-CCNC: 166 U/L — HIGH (ref 40–120)
ALT FLD-CCNC: 80 U/L — HIGH (ref 10–45)
ANION GAP SERPL CALC-SCNC: 9 MMOL/L — SIGNIFICANT CHANGE UP (ref 5–17)
ANISOCYTOSIS BLD QL: SIGNIFICANT CHANGE UP
APPEARANCE UR: CLEAR — SIGNIFICANT CHANGE UP
APTT BLD: 29.9 SEC — SIGNIFICANT CHANGE UP (ref 24.5–35.6)
AST SERPL-CCNC: 101 U/L — HIGH (ref 10–40)
B PERT DNA SPEC QL NAA+PROBE: SIGNIFICANT CHANGE UP
BACTERIA # UR AUTO: NEGATIVE /HPF — SIGNIFICANT CHANGE UP
BASOPHILS # BLD AUTO: 0 K/UL — SIGNIFICANT CHANGE UP (ref 0–0.2)
BASOPHILS NFR BLD AUTO: 0 % — SIGNIFICANT CHANGE UP (ref 0–2)
BILIRUB SERPL-MCNC: 0.2 MG/DL — SIGNIFICANT CHANGE UP (ref 0.2–1.2)
BILIRUB UR-MCNC: NEGATIVE — SIGNIFICANT CHANGE UP
BLD GP AB SCN SERPL QL: NEGATIVE — SIGNIFICANT CHANGE UP
BUN SERPL-MCNC: 5 MG/DL — LOW (ref 7–23)
C PNEUM DNA SPEC QL NAA+PROBE: SIGNIFICANT CHANGE UP
CALCIUM SERPL-MCNC: 8.5 MG/DL — SIGNIFICANT CHANGE UP (ref 8.4–10.5)
CAST: 0 /LPF — SIGNIFICANT CHANGE UP (ref 0–4)
CHLORIDE SERPL-SCNC: 106 MMOL/L — SIGNIFICANT CHANGE UP (ref 96–108)
CO2 SERPL-SCNC: 28 MMOL/L — SIGNIFICANT CHANGE UP (ref 22–31)
COLOR SPEC: YELLOW — SIGNIFICANT CHANGE UP
CREAT ?TM UR-MCNC: 75 MG/DL — SIGNIFICANT CHANGE UP
CREAT SERPL-MCNC: 0.56 MG/DL — SIGNIFICANT CHANGE UP (ref 0.5–1.3)
DACRYOCYTES BLD QL SMEAR: SLIGHT — SIGNIFICANT CHANGE UP
DIFF PNL FLD: ABNORMAL
EGFR: 123 ML/MIN/1.73M2 — SIGNIFICANT CHANGE UP
EOSINOPHIL # BLD AUTO: 0.09 K/UL — SIGNIFICANT CHANGE UP (ref 0–0.5)
EOSINOPHIL NFR BLD AUTO: 0.9 % — SIGNIFICANT CHANGE UP (ref 0–6)
FIBRINOGEN PPP-MCNC: 242 MG/DL — SIGNIFICANT CHANGE UP (ref 200–445)
FLUAV H1 2009 PAND RNA SPEC QL NAA+PROBE: SIGNIFICANT CHANGE UP
FLUAV H1 RNA SPEC QL NAA+PROBE: SIGNIFICANT CHANGE UP
FLUAV H3 RNA SPEC QL NAA+PROBE: SIGNIFICANT CHANGE UP
FLUAV SUBTYP SPEC NAA+PROBE: SIGNIFICANT CHANGE UP
FLUBV RNA SPEC QL NAA+PROBE: SIGNIFICANT CHANGE UP
GLUCOSE SERPL-MCNC: 95 MG/DL — SIGNIFICANT CHANGE UP (ref 70–99)
GLUCOSE UR QL: NEGATIVE MG/DL — SIGNIFICANT CHANGE UP
HADV DNA SPEC QL NAA+PROBE: SIGNIFICANT CHANGE UP
HCOV PNL SPEC NAA+PROBE: SIGNIFICANT CHANGE UP
HCT VFR BLD CALC: 26.7 % — LOW (ref 34.5–45)
HGB BLD-MCNC: 8.3 G/DL — LOW (ref 11.5–15.5)
HMPV RNA SPEC QL NAA+PROBE: SIGNIFICANT CHANGE UP
HPIV1 RNA SPEC QL NAA+PROBE: SIGNIFICANT CHANGE UP
HPIV2 RNA SPEC QL NAA+PROBE: SIGNIFICANT CHANGE UP
HPIV3 RNA SPEC QL NAA+PROBE: SIGNIFICANT CHANGE UP
HPIV4 RNA SPEC QL NAA+PROBE: SIGNIFICANT CHANGE UP
HYPOCHROMIA BLD QL: SLIGHT — SIGNIFICANT CHANGE UP
INR BLD: 0.93 — SIGNIFICANT CHANGE UP (ref 0.85–1.18)
KETONES UR-MCNC: NEGATIVE MG/DL — SIGNIFICANT CHANGE UP
LACTATE SERPL-SCNC: 1.2 MMOL/L — SIGNIFICANT CHANGE UP (ref 0.5–2)
LDH SERPL L TO P-CCNC: 349 U/L — HIGH (ref 50–242)
LEUKOCYTE ESTERASE UR-ACNC: ABNORMAL
LYMPHOCYTES # BLD AUTO: 1.87 K/UL — SIGNIFICANT CHANGE UP (ref 1–3.3)
LYMPHOCYTES # BLD AUTO: 18.2 % — SIGNIFICANT CHANGE UP (ref 13–44)
MAGNESIUM SERPL-MCNC: 1.8 MG/DL — SIGNIFICANT CHANGE UP (ref 1.6–2.6)
MAGNESIUM SERPL-MCNC: 3.8 MG/DL — HIGH (ref 1.6–2.6)
MANUAL SMEAR VERIFICATION: SIGNIFICANT CHANGE UP
MCHC RBC-ENTMCNC: 23.1 PG — LOW (ref 27–34)
MCHC RBC-ENTMCNC: 31.1 GM/DL — LOW (ref 32–36)
MCV RBC AUTO: 74.2 FL — LOW (ref 80–100)
MICROCYTES BLD QL: SIGNIFICANT CHANGE UP
MONOCYTES # BLD AUTO: 0.63 K/UL — SIGNIFICANT CHANGE UP (ref 0–0.9)
MONOCYTES NFR BLD AUTO: 6.1 % — SIGNIFICANT CHANGE UP (ref 2–14)
NEUTROPHILS # BLD AUTO: 7.68 K/UL — HIGH (ref 1.8–7.4)
NEUTROPHILS NFR BLD AUTO: 74.8 % — SIGNIFICANT CHANGE UP (ref 43–77)
NITRITE UR-MCNC: NEGATIVE — SIGNIFICANT CHANGE UP
NT-PROBNP SERPL-SCNC: 733 PG/ML — HIGH (ref 0–300)
OVALOCYTES BLD QL SMEAR: SLIGHT — SIGNIFICANT CHANGE UP
PH UR: 7 — SIGNIFICANT CHANGE UP (ref 5–8)
PHOSPHATE SERPL-MCNC: 3.5 MG/DL — SIGNIFICANT CHANGE UP (ref 2.5–4.5)
PLAT MORPH BLD: NORMAL — SIGNIFICANT CHANGE UP
PLATELET # BLD AUTO: 285 K/UL — SIGNIFICANT CHANGE UP (ref 150–400)
POIKILOCYTOSIS BLD QL AUTO: SIGNIFICANT CHANGE UP
POLYCHROMASIA BLD QL SMEAR: SIGNIFICANT CHANGE UP
POTASSIUM SERPL-MCNC: 3.3 MMOL/L — LOW (ref 3.5–5.3)
POTASSIUM SERPL-SCNC: 3.3 MMOL/L — LOW (ref 3.5–5.3)
PROT ?TM UR-MCNC: 12 MG/DL — SIGNIFICANT CHANGE UP (ref 0–12)
PROT SERPL-MCNC: 6 G/DL — SIGNIFICANT CHANGE UP (ref 6–8.3)
PROT UR-MCNC: NEGATIVE MG/DL — SIGNIFICANT CHANGE UP
PROT/CREAT UR-RTO: 0.2 RATIO — SIGNIFICANT CHANGE UP (ref 0–0.2)
PROTHROM AB SERPL-ACNC: 10.6 SEC — SIGNIFICANT CHANGE UP (ref 9.5–13)
RAPID RVP RESULT: DETECTED
RBC # BLD: 3.6 M/UL — LOW (ref 3.8–5.2)
RBC # FLD: 17.4 % — HIGH (ref 10.3–14.5)
RBC BLD AUTO: ABNORMAL
RBC CASTS # UR COMP ASSIST: 25 /HPF — HIGH (ref 0–4)
RENAL EPI CELLS # UR COMP ASSIST: PRESENT
RH IG SCN BLD-IMP: POSITIVE — SIGNIFICANT CHANGE UP
RSV RNA SPEC QL NAA+PROBE: DETECTED
RV+EV RNA SPEC QL NAA+PROBE: SIGNIFICANT CHANGE UP
RVP NOTIFY: SIGNIFICANT CHANGE UP
SARS-COV-2 RNA SPEC QL NAA+PROBE: SIGNIFICANT CHANGE UP
SODIUM SERPL-SCNC: 143 MMOL/L — SIGNIFICANT CHANGE UP (ref 135–145)
SP GR SPEC: 1.01 — SIGNIFICANT CHANGE UP (ref 1–1.03)
SQUAMOUS # UR AUTO: 2 /HPF — SIGNIFICANT CHANGE UP (ref 0–5)
TROPONIN T, HIGH SENSITIVITY RESULT: 34 NG/L — SIGNIFICANT CHANGE UP (ref 0–51)
URATE SERPL-MCNC: 4.6 MG/DL — SIGNIFICANT CHANGE UP (ref 2.5–7)
UROBILINOGEN FLD QL: 1 MG/DL — SIGNIFICANT CHANGE UP (ref 0.2–1)
WBC # BLD: 10.27 K/UL — SIGNIFICANT CHANGE UP (ref 3.8–10.5)
WBC # FLD AUTO: 10.27 K/UL — SIGNIFICANT CHANGE UP (ref 3.8–10.5)
WBC UR QL: 15 /HPF — HIGH (ref 0–5)

## 2024-03-27 PROCEDURE — 71045 X-RAY EXAM CHEST 1 VIEW: CPT | Mod: 26

## 2024-03-27 PROCEDURE — 99291 CRITICAL CARE FIRST HOUR: CPT

## 2024-03-27 PROCEDURE — 93306 TTE W/DOPPLER COMPLETE: CPT | Mod: 26

## 2024-03-27 RX ORDER — ALBUTEROL 90 UG/1
2 AEROSOL, METERED ORAL EVERY 6 HOURS
Refills: 0 | Status: DISCONTINUED | OUTPATIENT
Start: 2024-03-27 | End: 2024-03-29

## 2024-03-27 RX ORDER — METOCLOPRAMIDE HCL 10 MG
10 TABLET ORAL ONCE
Refills: 0 | Status: COMPLETED | OUTPATIENT
Start: 2024-03-27 | End: 2024-03-27

## 2024-03-27 RX ORDER — AER TRAVELER 0.5 G/1
1 SOLUTION RECTAL; TOPICAL EVERY 4 HOURS
Refills: 0 | Status: DISCONTINUED | OUTPATIENT
Start: 2024-03-27 | End: 2024-03-29

## 2024-03-27 RX ORDER — MAGNESIUM HYDROXIDE 400 MG/1
30 TABLET, CHEWABLE ORAL
Refills: 0 | Status: DISCONTINUED | OUTPATIENT
Start: 2024-03-27 | End: 2024-03-29

## 2024-03-27 RX ORDER — SODIUM CHLORIDE 9 MG/ML
3 INJECTION INTRAMUSCULAR; INTRAVENOUS; SUBCUTANEOUS EVERY 8 HOURS
Refills: 0 | Status: DISCONTINUED | OUTPATIENT
Start: 2024-03-27 | End: 2024-03-29

## 2024-03-27 RX ORDER — HYDROCORTISONE 1 %
1 OINTMENT (GRAM) TOPICAL EVERY 6 HOURS
Refills: 0 | Status: DISCONTINUED | OUTPATIENT
Start: 2024-03-27 | End: 2024-03-29

## 2024-03-27 RX ORDER — LANOLIN
1 OINTMENT (GRAM) TOPICAL EVERY 6 HOURS
Refills: 0 | Status: DISCONTINUED | OUTPATIENT
Start: 2024-03-27 | End: 2024-03-29

## 2024-03-27 RX ORDER — INFLUENZA VIRUS VACCINE 15; 15; 15; 15 UG/.5ML; UG/.5ML; UG/.5ML; UG/.5ML
0.5 SUSPENSION INTRAMUSCULAR ONCE
Refills: 0 | Status: DISCONTINUED | OUTPATIENT
Start: 2024-03-27 | End: 2024-03-29

## 2024-03-27 RX ORDER — BENZOCAINE 10 %
1 GEL (GRAM) MUCOUS MEMBRANE EVERY 6 HOURS
Refills: 0 | Status: DISCONTINUED | OUTPATIENT
Start: 2024-03-27 | End: 2024-03-29

## 2024-03-27 RX ORDER — MAGNESIUM SULFATE 500 MG/ML
2.5 VIAL (ML) INJECTION
Qty: 40 | Refills: 0 | Status: DISCONTINUED | OUTPATIENT
Start: 2024-03-27 | End: 2024-03-29

## 2024-03-27 RX ORDER — OXYCODONE HYDROCHLORIDE 5 MG/1
5 TABLET ORAL ONCE
Refills: 0 | Status: DISCONTINUED | OUTPATIENT
Start: 2024-03-27 | End: 2024-03-29

## 2024-03-27 RX ORDER — KETOROLAC TROMETHAMINE 30 MG/ML
30 SYRINGE (ML) INJECTION ONCE
Refills: 0 | Status: DISCONTINUED | OUTPATIENT
Start: 2024-03-27 | End: 2024-03-29

## 2024-03-27 RX ORDER — ACETAMINOPHEN 500 MG
975 TABLET ORAL
Refills: 0 | Status: DISCONTINUED | OUTPATIENT
Start: 2024-03-27 | End: 2024-03-29

## 2024-03-27 RX ORDER — OXYTOCIN 10 UNIT/ML
41.67 VIAL (ML) INJECTION
Qty: 20 | Refills: 0 | Status: DISCONTINUED | OUTPATIENT
Start: 2024-03-27 | End: 2024-03-29

## 2024-03-27 RX ORDER — DIBUCAINE 1 %
1 OINTMENT (GRAM) RECTAL EVERY 6 HOURS
Refills: 0 | Status: DISCONTINUED | OUTPATIENT
Start: 2024-03-27 | End: 2024-03-29

## 2024-03-27 RX ORDER — SIMETHICONE 80 MG/1
80 TABLET, CHEWABLE ORAL EVERY 4 HOURS
Refills: 0 | Status: DISCONTINUED | OUTPATIENT
Start: 2024-03-27 | End: 2024-03-29

## 2024-03-27 RX ORDER — METFORMIN HYDROCHLORIDE 850 MG/1
1 TABLET ORAL
Refills: 0 | DISCHARGE

## 2024-03-27 RX ORDER — PRAMOXINE HYDROCHLORIDE 150 MG/15G
1 AEROSOL, FOAM RECTAL EVERY 4 HOURS
Refills: 0 | Status: DISCONTINUED | OUTPATIENT
Start: 2024-03-27 | End: 2024-03-29

## 2024-03-27 RX ORDER — MAGNESIUM SULFATE 500 MG/ML
4 VIAL (ML) INJECTION ONCE
Refills: 0 | Status: COMPLETED | OUTPATIENT
Start: 2024-03-27 | End: 2024-03-27

## 2024-03-27 RX ORDER — IBUPROFEN 200 MG
600 TABLET ORAL EVERY 6 HOURS
Refills: 0 | Status: DISCONTINUED | OUTPATIENT
Start: 2024-03-27 | End: 2024-03-29

## 2024-03-27 RX ORDER — ACETAMINOPHEN 500 MG
1000 TABLET ORAL ONCE
Refills: 0 | Status: COMPLETED | OUTPATIENT
Start: 2024-03-27 | End: 2024-03-27

## 2024-03-27 RX ORDER — OXYCODONE HYDROCHLORIDE 5 MG/1
5 TABLET ORAL
Refills: 0 | Status: DISCONTINUED | OUTPATIENT
Start: 2024-03-27 | End: 2024-03-29

## 2024-03-27 RX ORDER — TETANUS TOXOID, REDUCED DIPHTHERIA TOXOID AND ACELLULAR PERTUSSIS VACCINE, ADSORBED 5; 2.5; 8; 8; 2.5 [IU]/.5ML; [IU]/.5ML; UG/.5ML; UG/.5ML; UG/.5ML
0.5 SUSPENSION INTRAMUSCULAR ONCE
Refills: 0 | Status: DISCONTINUED | OUTPATIENT
Start: 2024-03-27 | End: 2024-03-29

## 2024-03-27 RX ORDER — DIPHENHYDRAMINE HCL 50 MG
25 CAPSULE ORAL EVERY 6 HOURS
Refills: 0 | Status: DISCONTINUED | OUTPATIENT
Start: 2024-03-27 | End: 2024-03-29

## 2024-03-27 RX ORDER — SODIUM CHLORIDE 9 MG/ML
1000 INJECTION, SOLUTION INTRAVENOUS
Refills: 0 | Status: DISCONTINUED | OUTPATIENT
Start: 2024-03-27 | End: 2024-03-29

## 2024-03-27 RX ADMIN — SODIUM CHLORIDE 75 MILLILITER(S): 9 INJECTION, SOLUTION INTRAVENOUS at 17:50

## 2024-03-27 RX ADMIN — Medication 50 GM/HR: at 15:29

## 2024-03-27 RX ADMIN — Medication 104 MILLIGRAM(S): at 13:50

## 2024-03-27 RX ADMIN — Medication 975 MILLIGRAM(S): at 20:51

## 2024-03-27 RX ADMIN — Medication 300 GRAM(S): at 14:42

## 2024-03-27 RX ADMIN — Medication 400 MILLIGRAM(S): at 13:50

## 2024-03-27 NOTE — ED ADULT TRIAGE NOTE - CHIEF COMPLAINT QUOTE
Pt presents to ED S/P induced vaginal delivery x 2 days ago @ [facility]. . Pt states, " I had CHF, DM, pre-eclampsia so they induced me". Pt C/O SOB, HA, back pain at epidural site. EKG in progress.

## 2024-03-27 NOTE — ED PROVIDER NOTE - OBJECTIVE STATEMENT
33F PMH pre-DM p/w several symptoms. States that prior to pregnancy she was not on any meds. States that during her recent pregnancy she was diagnosed w/ HTN ~5 months but not started on any meds. States that she was induced at [facility] on 3/22 at 34 weeks 2/2 HTN. Left the hospital 2 days ago, states she was not started on any meds. States she was told she had HTN and CHF (though she states she never had echo). States that since last week (even prior to delivery) she feels SOB/constant CP. Also feels bitemporal throbbing HA, intermittent, also since prior to delivery. Also occasional brief blurry vision, none currently, no other vision changes. Also worsening b/l LE edema since prior to delivery. Feels that all symptoms are worsening and was not happy w/ care at [facility] so came to [facility]. Also notes fever 102 today and 3d of rhinorrhea/cough/congestion. Also has back pain where they placed the epidural. No other systemic symptoms.   Denies NVD, urinary complaints, black/bloody stool, rashes, focal weakness/numbness.

## 2024-03-27 NOTE — ED ADULT NURSE NOTE - OBJECTIVE STATEMENT
Pt. a&ox4 ambulatory with steady gait, hx of CHF (not on any diuretics), , induced and delivered @ approx 36 weeks on , dc'ed on . Induced d/t symptomatic increasing BP with HA, SOB / GONCALVES, and occasional L blurred vision. Had been dxed with preeclampsia and GD in this pregnancy, never placed on any meds for it, was placed on abx @ 4 months for recurring kidney infection. Pt. comes back to ED c/o worsening peripheral LE edema, SOB / positional orthopnea / GONCALVES, chest fullness / heaviness sensation, HA, and intermittent L eye blurred vision since dc. Made UG to [facility]. Placed on ccm, pulseox, and BP ; BGL and ekg done. NAD @ rest. Still bleeding heavily >3 small pads within an hour. Has hx of anemia, has never needed blood transfusion, did need 3 iron transfusions during/ immediately following delivery. Dc'ed on ASA 81mg and Fe+ PO.

## 2024-03-27 NOTE — H&P ADULT - SOCIAL HISTORY
Pre-Op, Intra-Op, and Post-Op processes and procedures were explained all related patient and family questions were answered  No

## 2024-03-27 NOTE — ED PROVIDER NOTE - PROGRESS NOTE DETAILS
[place]: Hgb 8.3, other labs being processed. BP increased, now SPB 140s. Rediscussed w/ GYN, will start Mg, holding antihypertensives for now. Will admit for further care. HA improving. SOB/CP unchanged. remains well appearing, no resp distress. Updated pt.
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
## 2024-03-27 NOTE — ED PROVIDER NOTE - CLINICAL SUMMARY MEDICAL DECISION MAKING FREE TEXT BOX
33F PMH pre-DM p/w several symptoms. States that prior to pregnancy she was not on any meds. States that during her recent pregnancy she was diagnosed w/ HTN ~5 months but not started on any meds. States that she was induced at [facility] on 3/22 at 34 weeks 2/2 HTN. Left the hospital 2 days ago, states she was not started on any meds. States she was told she had HTN and CHF (though she states she never had echo). States that since last week (even prior to delivery) she feels SOB/constant CP. Also feels bitemporal throbbing HA, intermittent, also since prior to delivery. Also occasional brief blurry vision, none currently, no other vision changes. Also worsening b/l LE edema since prior to delivery. Feels that all symptoms are worsening and was not happy w/ care at [facility] so came to [facility]. Also notes fever 102 today and 3d of rhinorrhea/cough/congestion. Also has back pain where they placed the epidural. No other systemic symptoms.   Mild bradycardia, hypertensive, other vitals wnl. Exam as above.   ddx: Possible post-partum preeclampsia. Clinically no ICH. Low suspicion CHF, clinically no significant pulmonary edema. Likely also viral URI. Low suspicion intra-abd/uterine infection. Clinically very unlikely to have infection at epidural site.   Labs, CXR, attempt symptom control - low threshold to given antihypertensives if BP worsening/persistent, GYN consult, reassess.

## 2024-03-27 NOTE — PROGRESS NOTE ADULT - SUBJECTIVE AND OBJECTIVE BOX
Patient evaluated at bedside for clinical magnesium check. Serum magnesium drawn at 2030  Patient denies visual disturbances including scotoma, headache, RUQ  pain, nausea, vomiting, epigastric pain, shortness of breath. Pain is controlled.      T(C): 36.4 (03-27-24 @ 18:00), Max: 37.1 (03-27-24 @ 12:56)  HR: 62 (03-27-24 @ 18:00) (53 - 69)  BP: 134/83 (03-27-24 @ 18:00) (122/71 - 161/78)  RR: 18 (03-27-24 @ 18:00) (17 - 18)  SpO2: 95% (03-27-24 @ 18:00) (95% - 99%)  Wt(kg): --  Daily Height in cm: 167.64 (27 Mar 2024 17:08)    Daily     03-27 @ 07:01  -  03-27 @ 20:16  --------------------------------------------------------  IN: 625 mL / OUT: 1100 mL / NET: -475 mL      Gen: no apparent distress  Cardiac : regular rate and rhythm; no murmurs  Pulm: no increased work of breathing; clear to auscultation bilaterally  Abd: soft, nontender, no rebound or guarding, no epigastric tenderness, liver nonpalpable, fundus firm  Ext: trace pedal edema bilaterally; Reflexes 2+ and symmetric bilaterally                          8.3    10.27 )-----------( 285      ( 27 Mar 2024 13:20 )             26.7     03-27    143  |  106  |  5<L>  ----------------------------<  95  3.3<L>   |  28  |  0.56    Ca    8.5      27 Mar 2024 13:20  Phos  3.5     03-27  Mg     1.8     03-27    TPro  6.0  /  Alb  3.4  /  TBili  0.2  /  DBili  x   /  AST  101<H>  /  ALT  80<H>  /  AlkPhos  166<H>  03-27    acetaminophen     Tablet .. 975 milliGRAM(s) Oral <User Schedule>  albuterol    90 MICROgram(s) HFA Inhaler 2 Puff(s) Inhalation every 6 hours PRN  benzocaine 20%/menthol 0.5% Spray 1 Spray(s) Topical every 6 hours PRN  dibucaine 1% Ointment 1 Application(s) Topical every 6 hours PRN  diphenhydrAMINE 25 milliGRAM(s) Oral every 6 hours PRN  diphtheria/tetanus/pertussis (acellular) Vaccine (Adacel) 0.5 milliLiter(s) IntraMuscular once  hydrocortisone 1% Cream 1 Application(s) Topical every 6 hours PRN  ibuprofen  Tablet. 600 milliGRAM(s) Oral every 6 hours  influenza   Vaccine 0.5 milliLiter(s) IntraMuscular once  ketorolac   Injectable 30 milliGRAM(s) IV Push once  lactated ringers. 1000 milliLiter(s) IV Continuous <Continuous>  lanolin Ointment 1 Application(s) Topical every 6 hours PRN  magnesium hydroxide Suspension 30 milliLiter(s) Oral two times a day PRN  magnesium sulfate Infusion 2 Gm/Hr IV Continuous <Continuous>  oxyCODONE    IR 5 milliGRAM(s) Oral every 3 hours PRN  oxyCODONE    IR 5 milliGRAM(s) Oral once PRN  oxytocin Infusion 41.667 milliUNIT(s)/Min IV Continuous <Continuous>  pramoxine 1%/zinc 5% Cream 1 Application(s) Topical every 4 hours PRN  prenatal multivitamin 1 Tablet(s) Oral daily  simethicone 80 milliGRAM(s) Chew every 4 hours PRN  sodium chloride 0.9% lock flush 3 milliLiter(s) IV Push every 8 hours  witch hazel Pads 1 Application(s) Topical every 4 hours PRN      03-27-24 @ 07:01  -  03-27-24 @ 20:16  --------------------------------------------------------  IN: 625 mL / OUT: 1100 mL / NET: -475 mL

## 2024-03-27 NOTE — ED PROVIDER NOTE - PHYSICAL EXAMINATION
b/l LE non-pitting edema  PERRL, EOMI, no nystagmus. CN intact. Strength 5/5. Steady unassisted gait. No pronator drift. Sensation intact. Normal speech, no dysarthria. No temporal ttp, no nuchal rigidity.  Back: punctate scab at epidural site, minimal localized ttp. soft. No crepitus, firmness, induration, fluctuance. Skin is normal temp. No erythema/warmth.  abd: soft, minimal b/l lower abd ttp, no rebound/guarding.

## 2024-03-27 NOTE — H&P ADULT - HISTORY OF PRESENT ILLNESS
33y  PPD4 from PT  @36w for "elevated blood pressures and heart failure" present with multiple complaints. The patient reports a 7/10 headache, has not tried any meds at home for relief. Denies chagne in headahce with position changes Also reports nasal congestion and cough x2 days with fever at home of 102 this morning. She additionally states she has intermittent shortness of breath and chest discomfort, although not currently. She endorses heavy VB using 3 pads/hour. Patient also states she had to have her epidural replaced dur labor and has back pain ever since. Patient is not breast feeding, baby is at home with the patients sister. The patient denies vision changes, RUQ pain, dizziness, syncope.     OBHx:   and  uncomlicated,  @36w for high BP (patient denies getting magnesium) at [facility]   Gyn H/x:  Reports small fibroids  H/x of cysts, endometriosis, denies   PMH: asthma  PSH: denies  Meds: albuterol prn   NKDA       T(C): 37.1 (24 @ 12:56), Max: 37.1 (24 @ 12:56)  HR: 57 (24 @ 13:50) (53 - 57)  BP: 149/71 (24 @ 13:50) (148/91 - 161/78)  RR: 18 (24 @ 13:50) (17 - 18)  SpO2: 95% (24 @ 13:50) (95% - 99%)    PHYSICAL EXAM:   Gen: NAD  Breast: soft, nontender, no erythema warmth or engorgement   GI: soft, nontender, nondistended + BS, no rebound no guarding, no fundal tenderness  : normal external genitalia, pad with minimal spotting noted  Ext: wnl        LABS:                        8.3    10.27 )-----------( 285      ( 27 Mar 2024 13:20 )             26.7           PT/INR - ( 27 Mar 2024 13:20 )   PT: 10.6 sec;   INR: 0.93          PTT - ( 27 Mar 2024 13:20 )  PTT:29.9 sec    EKG- sinus alissa (per ED attending)    RADIOLOGY & ADDITIONAL STUDIES:  CXR pending

## 2024-03-27 NOTE — H&P ADULT - ASSESSMENT
A/P: 33y  PPD4 from PTD for elevated BPs, meeting criteria for PEC with SF  - Admit to Ob postpartum   -  CLD   - Pain control with tylenol and motrion  - BP monitoring q4 hour   - PEC with severe features (BP and headache): one time severe range BP in ED, not requiring IV push. Mild range BPs otherwise, consider starting antihypertensice. Start IV mag now for seizure ppx. Patient states that she did not receive mag at OSH despite  delivery for presumed PEC, however patient is a poor historian.  Low suspicion for "heart failure" due to patient never having received echocardiogram, will follow up CXR and consider further cardiac workup if warranted. Headache likely 2/2 to PEC, tylenol and reglan ordered. Low suspicion for spinal headache due to lack of positional influence, will continue to monitor.   - Fever at home, afebrile in house. Follow up chest xray and RVP. Suspect viral URI       D/w Dr. Rosalia Verde Pa-C   negative

## 2024-03-27 NOTE — OB RN PATIENT PROFILE - FALL HARM RISK - RISK INTERVENTIONS
Assistance OOB with selected safe patient handling equipment/Assistance with ambulation/Communicate Fall Risk and Risk Factors to all staff, patient, and family/Reinforce activity limits and safety measures with patient and family/Review medications for side effects contributing to fall risk/Sit up slowly, dangle for a short time, stand at bedside before walking/Toileting schedule using arm’s reach rule for commode and bathroom/Visual Cue: Yellow wristband/Bed in lowest position, wheels locked, appropriate side rails in place/Call bell, personal items and telephone in reach/Instruct patient to call for assistance before getting out of bed or chair/Non-slip footwear when patient is out of bed/[place] to call system/Physically safe environment - no spills, clutter or unnecessary equipment/Purposeful Proactive Rounding/Room/bathroom lighting operational, light cord in reach

## 2024-03-27 NOTE — ED PROVIDER NOTE - INPATIENT RESIDENT/ACP NOTIFIED
November 29, 2017      Yazmin Dash MD  [address] [place] [address]           [facility]  [address] [place] [place] [address]  Phone: [phone]  Fax: [phone]          Patient: Kilo Dailey   MR Number: [id number]   YOB: 1960   Date of Visit: 11/29/2017       Dear Dr. Yazmin Dash:    Thank you for referring Kilo Dailey to me for evaluation. Attached you will find relevant portions of my assessment and plan of care.    If you have questions, please do not hesitate to call me. I look forward to following Kilo Dailey along with you.    Sincerely,    Chidi Matthew MD    Enclosure  CC:  No Recipients    If you would like to receive this communication electronically, please contact [email or web address] or [phone] to request more information on [facility] Link access.    For providers and/or their staff who would like to refer a patient to [facility], please contact us through our one-stop-shop provider referral line, [facility] [place], at [phone].    If you feel you have received this communication in error or would no longer like to receive these types of communications, please e-mail [email or web address]         
GYN team

## 2024-03-28 LAB
ALBUMIN SERPL ELPH-MCNC: 3.1 G/DL — LOW (ref 3.3–5)
ALBUMIN SERPL ELPH-MCNC: 3.3 G/DL — SIGNIFICANT CHANGE UP (ref 3.3–5)
ALP SERPL-CCNC: 158 U/L — HIGH (ref 40–120)
ALP SERPL-CCNC: 167 U/L — HIGH (ref 40–120)
ALT FLD-CCNC: 112 U/L — HIGH (ref 10–45)
ALT FLD-CCNC: 116 U/L — HIGH (ref 10–45)
ANION GAP SERPL CALC-SCNC: 9 MMOL/L — SIGNIFICANT CHANGE UP (ref 5–17)
ANION GAP SERPL CALC-SCNC: 9 MMOL/L — SIGNIFICANT CHANGE UP (ref 5–17)
APTT BLD: 29.9 SEC — SIGNIFICANT CHANGE UP (ref 24.5–35.6)
AST SERPL-CCNC: 130 U/L — HIGH (ref 10–40)
AST SERPL-CCNC: 133 U/L — HIGH (ref 10–40)
BASOPHILS # BLD AUTO: 0.02 K/UL — SIGNIFICANT CHANGE UP (ref 0–0.2)
BASOPHILS NFR BLD AUTO: 0.2 % — SIGNIFICANT CHANGE UP (ref 0–2)
BILIRUB SERPL-MCNC: 0.2 MG/DL — SIGNIFICANT CHANGE UP (ref 0.2–1.2)
BILIRUB SERPL-MCNC: 0.2 MG/DL — SIGNIFICANT CHANGE UP (ref 0.2–1.2)
BUN SERPL-MCNC: 3 MG/DL — LOW (ref 7–23)
BUN SERPL-MCNC: 4 MG/DL — LOW (ref 7–23)
CALCIUM SERPL-MCNC: 7.6 MG/DL — LOW (ref 8.4–10.5)
CALCIUM SERPL-MCNC: 7.8 MG/DL — LOW (ref 8.4–10.5)
CHLORIDE SERPL-SCNC: 105 MMOL/L — SIGNIFICANT CHANGE UP (ref 96–108)
CHLORIDE SERPL-SCNC: 107 MMOL/L — SIGNIFICANT CHANGE UP (ref 96–108)
CO2 SERPL-SCNC: 26 MMOL/L — SIGNIFICANT CHANGE UP (ref 22–31)
CO2 SERPL-SCNC: 27 MMOL/L — SIGNIFICANT CHANGE UP (ref 22–31)
CREAT SERPL-MCNC: 0.58 MG/DL — SIGNIFICANT CHANGE UP (ref 0.5–1.3)
CREAT SERPL-MCNC: 0.6 MG/DL — SIGNIFICANT CHANGE UP (ref 0.5–1.3)
EGFR: 121 ML/MIN/1.73M2 — SIGNIFICANT CHANGE UP
EGFR: 122 ML/MIN/1.73M2 — SIGNIFICANT CHANGE UP
EOSINOPHIL # BLD AUTO: 0.17 K/UL — SIGNIFICANT CHANGE UP (ref 0–0.5)
EOSINOPHIL NFR BLD AUTO: 1.8 % — SIGNIFICANT CHANGE UP (ref 0–6)
FIBRINOGEN PPP-MCNC: 273 MG/DL — SIGNIFICANT CHANGE UP (ref 200–445)
GLUCOSE SERPL-MCNC: 102 MG/DL — HIGH (ref 70–99)
GLUCOSE SERPL-MCNC: 106 MG/DL — HIGH (ref 70–99)
HCT VFR BLD CALC: 26.9 % — LOW (ref 34.5–45)
HGB BLD-MCNC: 8.3 G/DL — LOW (ref 11.5–15.5)
IMM GRANULOCYTES NFR BLD AUTO: 1.3 % — HIGH (ref 0–0.9)
INR BLD: 0.9 — SIGNIFICANT CHANGE UP (ref 0.85–1.18)
LDH SERPL L TO P-CCNC: 332 U/L — HIGH (ref 50–242)
LYMPHOCYTES # BLD AUTO: 2.65 K/UL — SIGNIFICANT CHANGE UP (ref 1–3.3)
LYMPHOCYTES # BLD AUTO: 28.7 % — SIGNIFICANT CHANGE UP (ref 13–44)
MAGNESIUM SERPL-MCNC: 4.5 MG/DL — HIGH (ref 1.6–2.6)
MAGNESIUM SERPL-MCNC: 5.2 MG/DL — HIGH (ref 1.6–2.6)
MCHC RBC-ENTMCNC: 23.1 PG — LOW (ref 27–34)
MCHC RBC-ENTMCNC: 30.9 GM/DL — LOW (ref 32–36)
MCV RBC AUTO: 74.7 FL — LOW (ref 80–100)
MONOCYTES # BLD AUTO: 0.68 K/UL — SIGNIFICANT CHANGE UP (ref 0–0.9)
MONOCYTES NFR BLD AUTO: 7.4 % — SIGNIFICANT CHANGE UP (ref 2–14)
NEUTROPHILS # BLD AUTO: 5.6 K/UL — SIGNIFICANT CHANGE UP (ref 1.8–7.4)
NEUTROPHILS NFR BLD AUTO: 60.6 % — SIGNIFICANT CHANGE UP (ref 43–77)
NRBC # BLD: 0 /100 WBCS — SIGNIFICANT CHANGE UP (ref 0–0)
PLATELET # BLD AUTO: 265 K/UL — SIGNIFICANT CHANGE UP (ref 150–400)
POTASSIUM SERPL-MCNC: 3.3 MMOL/L — LOW (ref 3.5–5.3)
POTASSIUM SERPL-MCNC: 3.5 MMOL/L — SIGNIFICANT CHANGE UP (ref 3.5–5.3)
POTASSIUM SERPL-SCNC: 3.3 MMOL/L — LOW (ref 3.5–5.3)
POTASSIUM SERPL-SCNC: 3.5 MMOL/L — SIGNIFICANT CHANGE UP (ref 3.5–5.3)
PROT SERPL-MCNC: 5.5 G/DL — LOW (ref 6–8.3)
PROT SERPL-MCNC: 5.8 G/DL — LOW (ref 6–8.3)
PROTHROM AB SERPL-ACNC: 10.3 SEC — SIGNIFICANT CHANGE UP (ref 9.5–13)
RBC # BLD: 3.6 M/UL — LOW (ref 3.8–5.2)
RBC # FLD: 17.7 % — HIGH (ref 10.3–14.5)
SODIUM SERPL-SCNC: 140 MMOL/L — SIGNIFICANT CHANGE UP (ref 135–145)
SODIUM SERPL-SCNC: 143 MMOL/L — SIGNIFICANT CHANGE UP (ref 135–145)
URATE SERPL-MCNC: 4.7 MG/DL — SIGNIFICANT CHANGE UP (ref 2.5–7)
WBC # BLD: 9.24 K/UL — SIGNIFICANT CHANGE UP (ref 3.8–10.5)
WBC # FLD AUTO: 9.24 K/UL — SIGNIFICANT CHANGE UP (ref 3.8–10.5)

## 2024-03-28 PROCEDURE — 70450 CT HEAD/BRAIN W/O DYE: CPT | Mod: 26

## 2024-03-28 RX ORDER — NIFEDIPINE 30 MG
30 TABLET, EXTENDED RELEASE 24 HR ORAL EVERY 24 HOURS
Refills: 0 | Status: DISCONTINUED | OUTPATIENT
Start: 2024-03-28 | End: 2024-03-29

## 2024-03-28 RX ORDER — POTASSIUM CHLORIDE 20 MEQ
40 PACKET (EA) ORAL EVERY 4 HOURS
Refills: 0 | Status: COMPLETED | OUTPATIENT
Start: 2024-03-28 | End: 2024-03-28

## 2024-03-28 RX ADMIN — Medication 975 MILLIGRAM(S): at 10:00

## 2024-03-28 RX ADMIN — Medication 975 MILLIGRAM(S): at 09:31

## 2024-03-28 RX ADMIN — Medication 40 MILLIEQUIVALENT(S): at 09:32

## 2024-03-28 RX ADMIN — SODIUM CHLORIDE 3 MILLILITER(S): 9 INJECTION INTRAMUSCULAR; INTRAVENOUS; SUBCUTANEOUS at 07:26

## 2024-03-28 RX ADMIN — SODIUM CHLORIDE 3 MILLILITER(S): 9 INJECTION INTRAMUSCULAR; INTRAVENOUS; SUBCUTANEOUS at 01:42

## 2024-03-28 RX ADMIN — Medication 1 TABLET(S): at 16:13

## 2024-03-28 RX ADMIN — Medication 975 MILLIGRAM(S): at 20:49

## 2024-03-28 RX ADMIN — Medication 975 MILLIGRAM(S): at 22:22

## 2024-03-28 RX ADMIN — Medication 40 MILLIEQUIVALENT(S): at 05:14

## 2024-03-28 RX ADMIN — SODIUM CHLORIDE 3 MILLILITER(S): 9 INJECTION INTRAMUSCULAR; INTRAVENOUS; SUBCUTANEOUS at 22:00

## 2024-03-28 RX ADMIN — SODIUM CHLORIDE 75 MILLILITER(S): 9 INJECTION, SOLUTION INTRAVENOUS at 05:24

## 2024-03-28 RX ADMIN — Medication 62.5 GM/HR: at 09:32

## 2024-03-28 RX ADMIN — Medication 30 MILLIGRAM(S): at 04:24

## 2024-03-28 RX ADMIN — Medication 975 MILLIGRAM(S): at 16:14

## 2024-03-28 RX ADMIN — Medication 975 MILLIGRAM(S): at 02:42

## 2024-03-28 NOTE — PROVIDER CONTACT NOTE (OTHER) - SITUATION
Pt SpO2 89% at rest. With deep inhalations and repositioning 91%. Arava Counseling:  Patient counseled regarding adverse effects of Arava including but not limited to nausea, vomiting, abnormalities in liver function tests. Patients may develop mouth sores, rash, diarrhea, and abnormalities in blood counts. The patient understands that monitoring is required including LFTs and blood counts.  There is a rare possibility of scarring of the liver and lung problems that can occur when taking methotrexate. Persistent nausea, loss of appetite, pale stools, dark urine, cough, and shortness of breath should be reported immediately. Patient advised to discontinue Arava treatment and consult with a physician prior to attempting conception. The patient will have to undergo a treatment to eliminate Arava from the body prior to conception.

## 2024-03-28 NOTE — PROGRESS NOTE ADULT - SUBJECTIVE AND OBJECTIVE BOX
Patient evaluated at bedside for clinical magnesium check. Serum magnesium drawn at 0230  Patient denies visual disturbances including scotoma, headache, RUQ  pain, nausea, vomiting, epigastric pain, shortness of breath. Pain is controlled.      T(C): 36.3 (03-27-24 @ 23:50), Max: 36.8 (03-27-24 @ 16:00)  HR: 78 (03-27-24 @ 23:50) (57 - 78)  BP: 138/72 (03-27-24 @ 23:50) (122/71 - 147/89)  RR: 18 (03-27-24 @ 23:50) (17 - 18)  SpO2: 97% (03-27-24 @ 23:50) (95% - 97%)  Wt(kg): --  Daily Height in cm: 167.64 (27 Mar 2024 17:08)    Daily     03-27 @ 07:01  -  03-28 @ 02:13  --------------------------------------------------------  IN: 1000 mL / OUT: 1950 mL / NET: -950 mL      Gen: no apparent distress  Cardiac : regular rate and rhythm; no murmurs  Pulm: no increased work of breathing; clear to auscultation bilaterally  Abd: soft, nontender, no rebound or guarding, no epigastric tenderness, liver nonpalpable, fundus firm  Ext: trace pedal edema bilaterally; Reflexes 2+ and symmetric bilaterally                          8.3    10.27 )-----------( 285      ( 27 Mar 2024 13:20 )             26.7     03-27    143  |  106  |  5<L>  ----------------------------<  95  3.3<L>   |  28  |  0.56    Ca    8.5      27 Mar 2024 13:20  Phos  3.5     03-27  Mg     3.8     03-27    TPro  6.0  /  Alb  3.4  /  TBili  0.2  /  DBili  x   /  AST  101<H>  /  ALT  80<H>  /  AlkPhos  166<H>  03-27    acetaminophen     Tablet .. 975 milliGRAM(s) Oral <User Schedule>  albuterol    90 MICROgram(s) HFA Inhaler 2 Puff(s) Inhalation every 6 hours PRN  benzocaine 20%/menthol 0.5% Spray 1 Spray(s) Topical every 6 hours PRN  dibucaine 1% Ointment 1 Application(s) Topical every 6 hours PRN  diphenhydrAMINE 25 milliGRAM(s) Oral every 6 hours PRN  diphtheria/tetanus/pertussis (acellular) Vaccine (Adacel) 0.5 milliLiter(s) IntraMuscular once  hydrocortisone 1% Cream 1 Application(s) Topical every 6 hours PRN  ibuprofen  Tablet. 600 milliGRAM(s) Oral every 6 hours  influenza   Vaccine 0.5 milliLiter(s) IntraMuscular once  ketorolac   Injectable 30 milliGRAM(s) IV Push once  lactated ringers. 1000 milliLiter(s) IV Continuous <Continuous>  lanolin Ointment 1 Application(s) Topical every 6 hours PRN  magnesium hydroxide Suspension 30 milliLiter(s) Oral two times a day PRN  magnesium sulfate Infusion 2 Gm/Hr IV Continuous <Continuous>  oxyCODONE    IR 5 milliGRAM(s) Oral every 3 hours PRN  oxyCODONE    IR 5 milliGRAM(s) Oral once PRN  oxytocin Infusion 41.667 milliUNIT(s)/Min IV Continuous <Continuous>  pramoxine 1%/zinc 5% Cream 1 Application(s) Topical every 4 hours PRN  prenatal multivitamin 1 Tablet(s) Oral daily  simethicone 80 milliGRAM(s) Chew every 4 hours PRN  sodium chloride 0.9% lock flush 3 milliLiter(s) IV Push every 8 hours  witch hazel Pads 1 Application(s) Topical every 4 hours PRN      03-27-24 @ 07:01  -  03-28-24 @ 02:13  --------------------------------------------------------  IN: 1000 mL / OUT: 1950 mL / NET: -950 mL

## 2024-03-28 NOTE — PROVIDER CONTACT NOTE (OTHER) - BACKGROUND
32 y/o F admitted for PEC PP day 3. Currently on Mg 2.5g/hr, Pt RSV+ and reported dyspnea on exertion. s/p CXR showing hypoinflation.

## 2024-03-28 NOTE — CHART NOTE - NSCHARTNOTEFT_GEN_A_CORE
Pt seen at bedside per RN report of desaturation to 89%. Patient reports feeling tired, but overall well. Denies pain, SOB, chest pain, cough, difficulty breathing, palpitations. She is reclining back in bed    PE:  Vitals: Spo2 89-->91% with deep breath, /84, HR 76 T 97.7  gen: well appearing, laying back in bed, obese  pulm: no increased WOB, CTAB  CV: RRR    2LNC applied, Spo2 improved to 94%. Counseled patient that recent TTE and CXR were wnl but CXR did note hypoventilation. Etiology may be due to recent hospitalization for delivery in addition to habitus/ increased risk for hypoventilation/CHLOE in the setting of acute RSV infection. Patient given IIS and instructed on use.   - Use IIS 5x every hour   - Continue 2LNC for now and wean off once Spo2>94%  - Continue to monitor closely Pt seen at bedside per RN report of desaturation to 89%. Patient reports feeling tired, but overall well. Denies pain, SOB, chest pain, cough, difficulty breathing, palpitations. She is reclining back in bed    PE:  Vitals: Spo2 89-->91% with deep breath, /84, HR 76 T 97.7  gen: well appearing, laying back in bed, obese  pulm: no increased WOB, CTAB  CV: RRR  extrem: BR reflexes 2+ bilaterally     2LNC applied, Spo2 improved to 94%. Counseled patient that recent TTE and CXR were wnl but CXR did note hypoventilation. Etiology may be due to recent hospitalization for delivery in addition to habitus/ increased risk for hypoventilation/CHLOE in the setting of acute RSV infection. Patient given IIS and instructed on use.   - Use IIS 5x every hour   - Continue 2LNC for now and wean off once Spo2>94%  - Continue to monitor closely  - Mg level to be drawn 0830, f/u level   - Continue IV Mg 2.5g/h until 1430

## 2024-03-29 ENCOUNTER — TRANSCRIPTION ENCOUNTER (OUTPATIENT)
Age: 34
End: 2024-03-29

## 2024-03-29 VITALS
RESPIRATION RATE: 18 BRPM | HEART RATE: 78 BPM | DIASTOLIC BLOOD PRESSURE: 84 MMHG | SYSTOLIC BLOOD PRESSURE: 136 MMHG | OXYGEN SATURATION: 95 % | TEMPERATURE: 98 F

## 2024-03-29 LAB
ALBUMIN SERPL ELPH-MCNC: 3.2 G/DL — LOW (ref 3.3–5)
ALP SERPL-CCNC: 151 U/L — HIGH (ref 40–120)
ALT FLD-CCNC: 75 U/L — HIGH (ref 10–45)
ANION GAP SERPL CALC-SCNC: 6 MMOL/L — SIGNIFICANT CHANGE UP (ref 5–17)
AST SERPL-CCNC: 41 U/L — HIGH (ref 10–40)
BILIRUB SERPL-MCNC: 0.2 MG/DL — SIGNIFICANT CHANGE UP (ref 0.2–1.2)
BUN SERPL-MCNC: 4 MG/DL — LOW (ref 7–23)
CALCIUM SERPL-MCNC: 8.7 MG/DL — SIGNIFICANT CHANGE UP (ref 8.4–10.5)
CHLORIDE SERPL-SCNC: 106 MMOL/L — SIGNIFICANT CHANGE UP (ref 96–108)
CO2 SERPL-SCNC: 26 MMOL/L — SIGNIFICANT CHANGE UP (ref 22–31)
CREAT SERPL-MCNC: 0.57 MG/DL — SIGNIFICANT CHANGE UP (ref 0.5–1.3)
CULTURE RESULTS: SIGNIFICANT CHANGE UP
EGFR: 122 ML/MIN/1.73M2 — SIGNIFICANT CHANGE UP
GLUCOSE SERPL-MCNC: 90 MG/DL — SIGNIFICANT CHANGE UP (ref 70–99)
POTASSIUM SERPL-MCNC: 4.1 MMOL/L — SIGNIFICANT CHANGE UP (ref 3.5–5.3)
POTASSIUM SERPL-SCNC: 4.1 MMOL/L — SIGNIFICANT CHANGE UP (ref 3.5–5.3)
PROT SERPL-MCNC: 6 G/DL — SIGNIFICANT CHANGE UP (ref 6–8.3)
SODIUM SERPL-SCNC: 138 MMOL/L — SIGNIFICANT CHANGE UP (ref 135–145)
SPECIMEN SOURCE: SIGNIFICANT CHANGE UP

## 2024-03-29 PROCEDURE — 82962 GLUCOSE BLOOD TEST: CPT

## 2024-03-29 PROCEDURE — 99291 CRITICAL CARE FIRST HOUR: CPT

## 2024-03-29 PROCEDURE — 85610 PROTHROMBIN TIME: CPT

## 2024-03-29 PROCEDURE — 84550 ASSAY OF BLOOD/URIC ACID: CPT

## 2024-03-29 PROCEDURE — 80053 COMPREHEN METABOLIC PANEL: CPT

## 2024-03-29 PROCEDURE — 85384 FIBRINOGEN ACTIVITY: CPT

## 2024-03-29 PROCEDURE — 96375 TX/PRO/DX INJ NEW DRUG ADDON: CPT

## 2024-03-29 PROCEDURE — 83880 ASSAY OF NATRIURETIC PEPTIDE: CPT

## 2024-03-29 PROCEDURE — 86850 RBC ANTIBODY SCREEN: CPT

## 2024-03-29 PROCEDURE — 0225U NFCT DS DNA&RNA 21 SARSCOV2: CPT

## 2024-03-29 PROCEDURE — 83605 ASSAY OF LACTIC ACID: CPT

## 2024-03-29 PROCEDURE — 84156 ASSAY OF PROTEIN URINE: CPT

## 2024-03-29 PROCEDURE — 83615 LACTATE (LD) (LDH) ENZYME: CPT

## 2024-03-29 PROCEDURE — 85730 THROMBOPLASTIN TIME PARTIAL: CPT

## 2024-03-29 PROCEDURE — 86900 BLOOD TYPING SEROLOGIC ABO: CPT

## 2024-03-29 PROCEDURE — 36415 COLL VENOUS BLD VENIPUNCTURE: CPT

## 2024-03-29 PROCEDURE — 70450 CT HEAD/BRAIN W/O DYE: CPT | Mod: MC

## 2024-03-29 PROCEDURE — 81001 URINALYSIS AUTO W/SCOPE: CPT

## 2024-03-29 PROCEDURE — 87040 BLOOD CULTURE FOR BACTERIA: CPT

## 2024-03-29 PROCEDURE — 86901 BLOOD TYPING SEROLOGIC RH(D): CPT

## 2024-03-29 PROCEDURE — 82570 ASSAY OF URINE CREATININE: CPT

## 2024-03-29 PROCEDURE — 84100 ASSAY OF PHOSPHORUS: CPT

## 2024-03-29 PROCEDURE — 84484 ASSAY OF TROPONIN QUANT: CPT

## 2024-03-29 PROCEDURE — 87086 URINE CULTURE/COLONY COUNT: CPT

## 2024-03-29 PROCEDURE — 96374 THER/PROPH/DIAG INJ IV PUSH: CPT

## 2024-03-29 PROCEDURE — 71045 X-RAY EXAM CHEST 1 VIEW: CPT

## 2024-03-29 PROCEDURE — 93306 TTE W/DOPPLER COMPLETE: CPT

## 2024-03-29 PROCEDURE — 83735 ASSAY OF MAGNESIUM: CPT

## 2024-03-29 PROCEDURE — 85025 COMPLETE CBC W/AUTO DIFF WBC: CPT

## 2024-03-29 RX ORDER — IBUPROFEN 200 MG
1 TABLET ORAL
Qty: 0 | Refills: 0 | DISCHARGE
Start: 2024-03-29

## 2024-03-29 RX ORDER — NIFEDIPINE 30 MG
1 TABLET, EXTENDED RELEASE 24 HR ORAL
Qty: 30 | Refills: 0
Start: 2024-03-29 | End: 2024-04-27

## 2024-03-29 RX ORDER — ALBUTEROL 90 UG/1
2 AEROSOL, METERED ORAL
Qty: 0 | Refills: 0 | DISCHARGE
Start: 2024-03-29

## 2024-03-29 RX ORDER — NIFEDIPINE 30 MG
1 TABLET, EXTENDED RELEASE 24 HR ORAL
Qty: 0 | Refills: 0 | DISCHARGE
Start: 2024-03-29

## 2024-03-29 RX ORDER — ACETAMINOPHEN 500 MG
3 TABLET ORAL
Qty: 0 | Refills: 0 | DISCHARGE
Start: 2024-03-29

## 2024-03-29 RX ADMIN — Medication 30 MILLIGRAM(S): at 00:43

## 2024-03-29 NOTE — DISCHARGE NOTE OB - PATIENT PORTAL LINK FT
You can access the FollowMyHealth Patient Portal offered by [facility] by registering at the following website: [email or web address]. By joining [employer]’s FollowMyHealth portal, you will also be able to view your health information using other applications (apps) compatible with our system.

## 2024-03-29 NOTE — PROGRESS NOTE ADULT - SUBJECTIVE AND OBJECTIVE BOX
Patient evaluated at bedside this morning, resting comfortable in bed, no acute events overnight. She reports pain is well-controlled.  She denies headache, dizziness, changes in vision, chest pain, palpitations, shortness of breath, RUQ pain, nausea, vomiting, fever, chills, heavy vaginal bleeding.  She has been ambulating without assistance, voiding spontaneously, tolerating food.      Physical Exam:  T(C): 36.8 (03-29-24 @ 06:13), Max: 36.8 (03-29-24 @ 06:13)  HR: 79 (03-29-24 @ 06:13) (74 - 79)  BP: 126/76 (03-29-24 @ 06:13) (123/79 - 136/88)  RR: 16 (03-29-24 @ 06:13) (16 - 18)  SpO2: 91% (03-29-24 @ 06:13) (91% - 96%)    Gen: no apparent distress  Pulm: no increased work of breathing  Abd: soft, nontender, nondistended, no rebound or guarding, uterus firm  Extremities: trace pedal edema bilaterally, no calf tenderness bilaterally                          8.3    9.24  )-----------( 265      ( 28 Mar 2024 02:10 )             26.9     03-28    143  |  107  |  3<L>  ----------------------------<  102<H>  3.5   |  27  |  0.58    Ca    7.6<L>      28 Mar 2024 08:31  Phos  3.5     03-27  Mg     5.2     03-28    TPro  5.8<L>  /  Alb  3.3  /  TBili  0.2  /  DBili  x   /  AST  130<H>  /  ALT  116<H>  /  AlkPhos  167<H>  03-28    acetaminophen     Tablet .. 975 milliGRAM(s) Oral <User Schedule>  albuterol    90 MICROgram(s) HFA Inhaler 2 Puff(s) Inhalation every 6 hours PRN  benzocaine 20%/menthol 0.5% Spray 1 Spray(s) Topical every 6 hours PRN  dibucaine 1% Ointment 1 Application(s) Topical every 6 hours PRN  diphenhydrAMINE 25 milliGRAM(s) Oral every 6 hours PRN  diphtheria/tetanus/pertussis (acellular) Vaccine (Adacel) 0.5 milliLiter(s) IntraMuscular once  hydrocortisone 1% Cream 1 Application(s) Topical every 6 hours PRN  ibuprofen  Tablet. 600 milliGRAM(s) Oral every 6 hours  influenza   Vaccine 0.5 milliLiter(s) IntraMuscular once  ketorolac   Injectable 30 milliGRAM(s) IV Push once  lactated ringers. 1000 milliLiter(s) IV Continuous <Continuous>  lanolin Ointment 1 Application(s) Topical every 6 hours PRN  magnesium hydroxide Suspension 30 milliLiter(s) Oral two times a day PRN  magnesium sulfate Infusion 2.5 Gm/Hr IV Continuous <Continuous>  NIFEdipine XL 30 milliGRAM(s) Oral every 24 hours  oxyCODONE    IR 5 milliGRAM(s) Oral every 3 hours PRN  oxyCODONE    IR 5 milliGRAM(s) Oral once PRN  oxytocin Infusion 41.667 milliUNIT(s)/Min IV Continuous <Continuous>  pramoxine 1%/zinc 5% Cream 1 Application(s) Topical every 4 hours PRN  prenatal multivitamin 1 Tablet(s) Oral daily  simethicone 80 milliGRAM(s) Chew every 4 hours PRN  sodium chloride 0.9% lock flush 3 milliLiter(s) IV Push every 8 hours  witch hazel Pads 1 Application(s) Topical every 4 hours PRN

## 2024-03-29 NOTE — DISCHARGE NOTE OB - PLAN OF CARE
- continue Nifedipine 30mg once per day   - Follow up with your OB in one week for a blood pressure check (call to confirm appointment).  - Purchase electronic blood pressure cuff at your local pharmacy.   - Check blood pressure 3x a day and write it down (bring to your doctor's appointment).   - If BP >150/100 please call your doctor.   - If BP >160/110, you develop a headache not relieved by tylenol, visual disturbances, or have right upper abdominal pain, call your doctor or the hospital, or go to your nearest emergency room.   - Regular diet, normal activity, nothing in the vagina for 6 weeks--no sex, tampons, tub baths, or swimming pools.

## 2024-03-29 NOTE — DISCHARGE NOTE OB - CARE PLAN
Principal Discharge DX:	Preeclampsia  Assessment and plan of treatment:	- continue Nifedipine 30mg once per day   - Follow up with your OB in one week for a blood pressure check (call to confirm appointment).  - Purchase electronic blood pressure cuff at your local pharmacy.   - Check blood pressure 3x a day and write it down (bring to your doctor's appointment).   - If BP >150/100 please call your doctor.   - If BP >160/110, you develop a headache not relieved by tylenol, visual disturbances, or have right upper abdominal pain, call your doctor or the hospital, or go to your nearest emergency room.   - Regular diet, normal activity, nothing in the vagina for 6 weeks--no sex, tampons, tub baths, or swimming pools.  Secondary Diagnosis:	Respiratory syncytial virus (RSV) laryngotracheobronchitis  Secondary Diagnosis:	RSV infection   1

## 2024-03-29 NOTE — DISCHARGE NOTE OB - PROVIDER TOKENS
FREE:[LAST:[Primary OBGYN],PHONE:[(   )    -],FAX:[(   )    -],ESTABLISHEDPATIENT:[T]],FREE:[[facility]PHONE:[phone]],FAX:[(   )    -],ADDRESS:[[address] [place] [address]]]
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
## 2024-03-29 NOTE — PROGRESS NOTE ADULT - ASSESSMENT
A&P:  33y  s/p , now PPD #5, readmitted for preeclampsia with severe features being blood pressure, headache, liver enzymes, HD #1  No toxic signs or symptoms currently. No severe range BP.  Antihypertensives: none  Mg level is pending    - Continue IV Magnesium @2g/hr until 1430 3/28  - Continue to monitor blood pressures  - Follow up on Magnesium serum levels. Next Magnesium check at    - GI: Regular diet  - : Strict I&Os        
A/P  34yo  s/p  at 39+2 at outside hospital on 3/23 c/b gHTN, admitted for preeclampsia with severe features being elevated BP, transaminitis, and HA   - PEC: normotensive; denies toxic symptoms; s/p IV magnesium; continue nifedipine 30 QD; continue to monitor; follow CMP to trend LFTs (stable to now)  - Pain: well controlled on analgesics as above  - GI: Tolerating regular diet  - : urinating without difficulty/pain  - DVT prophylaxis: ambulating frequently  - Dispo: Pending attending approval    
HPI Comments: Mr. Katherine Dong is a 50-year-old male with past medical story of coronary disease, small bowel obstruction, pyloric stenosis, previous gastric ulcer, presenting with complaints of fatigue, and melena. Patient states symptoms have been ongoing for 4-5 days, describes extreme fatigue, without chest pain, shortness or breath, nausea or vomiting. He endorses use of aspirin and Plavix for cardiac stent placed several months ago. He endorses previous colonoscopy unremarkable, denies previous endoscopy. He denies heavy use of alcohol, or frequent use of aspirin or NSAIDs. Patient is a 49 y.o. male presenting with fatigue. The history is provided by the patient. No  was used. Fatigue This is a new problem. The current episode started more than 2 days ago. The problem has not changed since onset. There was no focality noted. Pertinent negatives include no focal weakness, no loss of sensation, no loss of balance, no slurred speech, no speech difficulty, no memory loss, no movement disorder, no agitation, no visual change, no auditory change, no mental status change, no unresponsiveness and no disorientation. There has been no fever. Pertinent negatives include no shortness of breath, no chest pain, no vomiting, no altered mental status, no confusion, no headaches, no choking, no nausea, no bowel incontinence and no bladder incontinence. Associated medical issues do not include trauma, mood changes, bleeding disorder, seizures, dementia, CVA or clotting disorder. Past Medical History:  
Diagnosis Date  CAD (coronary artery disease)  H/O small bowel obstruction  Pyloric stenosis Past Surgical History:  
Procedure Laterality Date 1913 [facility] Family History:  
Problem Relation Age of Onset  Cancer Mother   
  Bertin Arce cancer survivor  Diabetes Father Social History Social History  Marital status:   

Spouse name: N/A  
 Number of children: N/A  
 Years of education: N/A Occupational History  Not on file. Social History Main Topics  Smoking status: Never Smoker  Smokeless tobacco: Never Used  Alcohol use No  
   Comment: social  
 Drug use: Not on file  Sexual activity: Not on file Other Topics Concern  Not on file Social History Narrative ALLERGIES: Morphine Review of Systems Constitutional: Positive for fatigue. Negative for activity change, chills and fever. HENT: Negative for nosebleeds, sore throat, trouble swallowing and voice change. Eyes: Negative for visual disturbance. Respiratory: Negative for choking and shortness of breath. Cardiovascular: Negative for chest pain and palpitations. Gastrointestinal: Positive for blood in stool. Negative for abdominal pain, bowel incontinence, constipation, diarrhea, nausea and vomiting. Genitourinary: Negative for bladder incontinence, difficulty urinating, dysuria, hematuria and urgency. Musculoskeletal: Negative for back pain, neck pain and neck stiffness. Skin: Negative for color change. Allergic/Immunologic: Negative for immunocompromised state. Neurological: Negative for dizziness, focal weakness, seizures, syncope, speech difficulty, weakness, light-headedness, numbness, headaches and loss of balance. Psychiatric/Behavioral: Negative for agitation, behavioral problems, confusion, hallucinations, memory loss, self-injury and suicidal ideas. Vitals:  
 08/29/18 0167 BP: 138/86 Pulse: 89 Resp: 16 Temp: 98.1 °F (36.7 °C) SpO2: 99% Weight: 90.7 kg (200 lb) Height: 5' 7\" (1.702 m) Physical Exam  
Constitutional: He is oriented to person, place, and time. He appears well-developed and well-nourished. No distress. HENT:  
Head: Normocephalic and atraumatic. Eyes: Pupils are equal, round, and reactive to light. Neck: Normal range of motion. Neck supple.
Cardiovascular: Normal rate, regular rhythm and normal heart sounds. Exam reveals no gallop and no friction rub. No murmur heard. Pulmonary/Chest: Effort normal and breath sounds normal. No respiratory distress. He has no wheezes. Abdominal: Soft. Bowel sounds are normal. He exhibits no distension. There is no tenderness. There is no rebound and no guarding. Musculoskeletal: Normal range of motion. Neurological: He is alert and oriented to person, place, and time. Skin: Skin is warm. No rash noted. He is not diaphoretic. Psychiatric: He has a normal mood and affect. His behavior is normal. Judgment and thought content normal.  
Nursing note and vitals reviewed. MDM Number of Diagnoses or Management Options Anemia, unspecified type: Upper GI bleed:  
Diagnosis management comments: Total critical care time spent exclusive of procedures:  35min ED EKG interpretation: 
Rhythm: normal sinus rhythm; and regular . Rate (approx.): 86; Axis: normal; P wave: normal; QRS interval: normal ; ST/T wave: normal; 
EKG as interpreted by Luis Felipe Mejia MD, ED MD. Critical Care Total time providing critical care: 30-74 minutes ED Course This is a 43-year-old male with past medical history, review of systems, physical exam as above, presenting with malaise, fatigue, melena. Patient with use of aspirin, Plavix for recent cardiac stenting, history of ulcer. Physical exam remarkable for well-appearing middle-age male in no acute distress was soft nontender abdomen, clear breath sounds, regular rate and rhythm without murmurs gallops or rubs. Rectal exam, with scant dark brown stool, no edison bleeding, one external hemorrhoid. Differential includes worsening cardiac disease, upper GI bleed, electrolyte abnormality, dehydration. Plan to obtain CMP, CBC, coags, type and screen, chest x-ray, EKG, cardiac enzymes.  We will reevaluate, and make a disposition based the
A&P:  33y  s/p , now PPD #5, readmitted for preeclampsia with severe features being blood pressure, headache, liver enzymes, HD #2  No toxic signs or symptoms currently. No severe range BP.  Antihypertensives: none  Mg level is pending    - Continue IV Magnesium @2g/hr until 1430 3/28  - Continue to monitor blood pressures  - Follow up on Magnesium serum levels. Next Magnesium check at 0230  - GI: Regular diet  - : Strict I&Os    
patient's diagnostics and response to therapy. Procedures 9:20 AM 
Attempted to evaluate patient, not roomed yet. 9:57 AM 
Patient with abnormally low Hb, likely UGI bleed, will provide PPI, consult GI. 
 
10:10 AM 
Discussed via [place] Text with the Hospitalist, Dr. Surya Fields will admit the patient.

## 2024-03-29 NOTE — DISCHARGE NOTE OB - MEDICATION SUMMARY - MEDICATIONS TO TAKE
I will START or STAY ON the medications listed below when I get home from the hospital:    ibuprofen 600 mg oral tablet  -- 1 tab(s) by mouth every 6 hours  -- Indication: For Pain    acetaminophen 325 mg oral tablet  -- 3 tab(s) by mouth every 6 hours  -- Indication: For Pain    albuterol 90 mcg/inh inhalation aerosol  -- 2 puff(s) inhaled every 6 hours As needed Bronchospasm  -- Indication: For asthma    NIFEdipine 30 mg oral tablet, extended release  -- 1 tab(s) by mouth every 24 hours  -- Indication: For Preeclampsia    Prenatal 19 ([place]) oral tablet  -- 1 tab(s) by mouth once a day  -- Indication: For breastfeeding

## 2024-03-29 NOTE — DISCHARGE NOTE OB - CARE PROVIDER_API CALL
Primary OBGYN,   Phone: (   )    -  Fax: (   )    -  Established Patient  Follow Up Time:     [facility],   [address] [place] [address]  Phone: [phone]  Fax: (   )    -  Follow Up Time:
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
## 2024-04-04 DIAGNOSIS — Z28.21 IMMUNIZATION NOT CARRIED OUT BECAUSE OF PATIENT REFUSAL: ICD-10-CM

## 2024-04-04 DIAGNOSIS — G44.309 POST-TRAUMATIC HEADACHE, UNSPECIFIED, NOT INTRACTABLE: ICD-10-CM

## 2024-04-04 DIAGNOSIS — R51.9 HEADACHE, UNSPECIFIED: ICD-10-CM

## 2024-04-04 DIAGNOSIS — J06.9 ACUTE UPPER RESPIRATORY INFECTION, UNSPECIFIED: ICD-10-CM

## 2024-04-04 DIAGNOSIS — B97.4 RESPIRATORY SYNCYTIAL VIRUS AS THE CAUSE OF DISEASES CLASSIFIED ELSEWHERE: ICD-10-CM
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
## 2024-04-13 NOTE — ED ADULT TRIAGE NOTE - NS ED TRIAGE AVPU SCALE
[facility]  313/1  PROGRESS NOTE   Patient: Jennifer Astudillo  Today's Date: 4/13/2024    YOB: 1939  Admission Date: 4/11/2024    MRN: [id number]  Inpatient LOS: 1    Attending: Veronica Phillips MD  Hospital Day: Hospital Day: 3    Subjective   HISTORY AND SUBJECTIVE COMPLAINTS     Chief Complaint:   Generalized weakness.  Inability to care for self.    Interval History / Subjective:   Jennifer is resting in bed this morning.  She has no complaints.  She has not participated with physical therapy as of yet.  Her appetite is rather poor.  She denies any fever or chills.  She denies chest pain shortness for breath.  She denies abdominal pain, nausea, or vomiting.  She does not have any focal neurological deficits.  Urine culture still in progress.    Hospital Course:  Jennifer Astudillo is a 84 year old female who presented on 4/11/2024 with complaints of Weakness  .    ROS:  Pertinent systems negative except as above.    Objective   PHYSICAL EXAMINATION     Vital 24 Hour Range Most Recent Value   Temperature Temp  Min: 97.4 °F (36.3 °C)  Max: 97.9 °F (36.6 °C) 97.9 °F (36.6 °C)   Pulse Pulse  Min: 52  Max: 78 (!) 59   Respiratory Resp  Min: 16  Max: 20 20   Blood Pressure BP  Min: 135/78  Max: 167/75 138/76   Pulse Oximetry SpO2  Min: 91 %  Max: 94 % 93 %   Arterial BP No data recorded     O2 No data recorded       Recorded Intake and Output:  Intake/Output Summary (Last 24 hours) at 4/13/2024 1906  Last data filed at 4/13/2024 1800  Gross per 24 hour   Intake 300 ml   Output 625 ml   Net -325 ml      Recorded Last Stool Occurrence:       Vital Most Recent Value First Value   Weight 69 kg (152 lb 1.9 oz) Weight: 70.9 kg (156 lb 4.9 oz)   Height 5' 6\" (167.6 cm) Height: 5' 6\" (167.6 cm)   BMI 24.55 N/A     General: Looks chronically ill no apparent distress  CV: regular rate and rhythm and no murmurs, rubs, or thrills  Resp: clear to auscultation bilaterally  Abd: soft, nontender,  nondistended, and no hepatosplenomegaly  Ext: no rashes, lesions, or ulcers noted, no clubbing, cyanosis, or ischemia and 5/5 muscle strength in upper and lower extremities bilaterally, and edema absent   Skin: no rashes, lesions, or ulcers noted  Neuro: CN 2-12 grossly intact, normal sensation , and no focal deficits noted  Psych: oriented to time, place and person and flat affect    TEST RESULTS     Labs: The Laboratory values listed below have been reviewed and pertinent findings discussed in the Assessment and Plan.    Laboratory values:   Recent Labs   Lab 04/13/24  0547 04/12/24  0555 04/11/24  1757   WBC 5.1 5.6 6.3   HGB 12.6 13.4 14.4   HCT 41.6 41.5 47.2*    150 208         Recent Labs   Lab 04/13/24  0547 04/12/24  0555 04/11/24  1757   SODIUM 141 141 139   POTASSIUM 4.1 4.7 5.1   CHLORIDE 106 103 103   CO2 26 25 26   CALCIUM 10.0 10.2 10.6*   GLUCOSE 101* 77 80   BUN 43* 54* 60*   CREATININE 1.28* 1.38* 1.63*   MG 1.9  --  2.2        Recent Labs   Lab 04/13/24  0547 04/11/24  1757   ALBUMIN 2.4* 3.1*   AST 11 17   GPT 7 14   BILIRUBIN 0.4 0.9     Recent Labs   Lab 04/11/24  1808   LACTA 1.3       Radiology: Imaging studies have been reviewed and pertinent findings discussed in the Assessment and Plan.  Results for orders placed or performed during the hospital encounter of 04/11/24 (from the past 48 hour(s))   CT HEAD WO CONTRAST    Impression    IMPRESSION:   1. No acute intracranial findings. No intracranial hemorrhage or mass  effect.  2. Moderate chronic changes including parenchymal volume loss and white  matter sequela of chronic microvascular disease.  3. MRI is more sensitive for acute ischemia.    Electronically Signed by: Binu Winn MD  Signed on: 4/13/2024 12:10 PM  Created on Workstation ID: [id number]  Signed on Workstation ID: [id number]        ANCILLARY ORDERS     Diet:  Regular Diet  Daily - Pm Snack; Ensure Plus Hp/Standard Oral Supplement Oral Nutrition Supplement  Telemetry:  Off  Consults:    IP CONSULT TO SOCIAL WORK  IP CONSULT TO RN WOUND CARE  IP CONSULT TO NUTRITION SERVICES  Therapy Orders:   PT and OT Orders Placed this Encounter   Procedures    Occupational Therapy Evaluation    Occupational Therapy Treatment    Physical Therapy Evaluation    Physical Therapy Treatment       ADVANCED DIRECTIVES     Code Status: Full Resuscitation       ASSESSMENT AND PLAN     Assessment and plan:       1. Generalized weakness and inability to perform ADLs:  Patient does have a UTI.  This could be contributing.  ---PT OT to evaluate and treat.  ---IV antibiotics.  ---patient may need subacute rehab.     2. Acute cystitis without sepsis:  ---await cultures.  Culture still pending.  ---continue Cipro.  She lists Bactrim and cephalosporins as an allergy.     3. Lower extremity numbness:  Patient has some paresthesias.  It might be related to alcohol use.  B12 and folic acid levels are okay.     4. Chronic kidney disease stage IIIB with prerenal azotemia  BUN and creatinine are better today.  43 and 1.28.  --continue gentle IV hydration until tomorrow  ---avoid nephrotoxins        5. Coronary artery disease:  Patient is asymptomatic.  She had monitor severe CAD of the LAD based on catheterization in 2022.  She would 60-70% stenosis.  Diagonal had 70% stenosis.  ---medical management.     6. Severe COPD:  ---albuterol p.r.n..     7. Hypertension:  Coreg and losartan restarted.     8. Anxiety:  ---continue Zoloft    Smoking status: Former Tobacco User    Nutrition status: Does Not Meet Criteria for Malnutrition   Body mass index is 24.55 kg/m². - Patient is overweight with BMI 24-30  DVT Prophylaxis: Lovenox prophylactic dosing (dose adjusted as per renal function)      DISCHARGE PLANNING     The patient's treatment plans were discussed with patient and RN.    Discharge Planning    Barriers to discharge: Patient is not medically ready and needs to remain in the hospital today due to requires pt/ot.  Await cultures  Anticipated discharge destination: Sub-Acute  Expected Discharge Date: 4/15/2024          Veronica Phillips MDHospitalist  4/13/2024  7:06 PM     Alert-The patient is alert, awake and responds to voice. The patient is oriented to time, place, and person. The triage nurse is able to obtain subjective information.

## 2024-08-10 ENCOUNTER — EMERGENCY (EMERGENCY)
Facility: HOSPITAL | Age: 34
LOS: 1 days | Discharge: HOME | End: 2024-08-10
Attending: EMERGENCY MEDICINE | Admitting: EMERGENCY MEDICINE
Payer: COMMERCIAL

## 2024-08-10 VITALS
WEIGHT: 240.08 LBS | HEIGHT: 66 IN | HEART RATE: 83 BPM | RESPIRATION RATE: 16 BRPM | OXYGEN SATURATION: 98 % | TEMPERATURE: 98 F | DIASTOLIC BLOOD PRESSURE: 80 MMHG | SYSTOLIC BLOOD PRESSURE: 115 MMHG

## 2024-08-10 DIAGNOSIS — Z91.013 ALLERGY TO SEAFOOD: ICD-10-CM

## 2024-08-10 DIAGNOSIS — N12 TUBULO-INTERSTITIAL NEPHRITIS, NOT SPECIFIED AS ACUTE OR CHRONIC: ICD-10-CM

## 2024-08-10 DIAGNOSIS — R10.9 UNSPECIFIED ABDOMINAL PAIN: ICD-10-CM

## 2024-08-10 PROCEDURE — 76705 ECHO EXAM OF ABDOMEN: CPT | Mod: 26

## 2024-08-10 PROCEDURE — 99284 EMERGENCY DEPT VISIT MOD MDM: CPT | Mod: 25

## 2024-08-10 RX ORDER — BACTERIOSTATIC SODIUM CHLORIDE 0.9 %
1000 VIAL (ML) INJECTION ONCE
Refills: 0 | Status: COMPLETED | OUTPATIENT
Start: 2024-08-10 | End: 2024-08-10

## 2024-08-10 RX ORDER — ACETAMINOPHEN 500 MG
1000 TABLET ORAL ONCE
Refills: 0 | Status: COMPLETED | OUTPATIENT
Start: 2024-08-10 | End: 2024-08-10

## 2024-08-10 RX ORDER — KETOROLAC TROMETHAMINE 10 MG
30 TABLET ORAL ONCE
Refills: 0 | Status: DISCONTINUED | OUTPATIENT
Start: 2024-08-10 | End: 2024-08-10

## 2024-08-10 RX ORDER — ONDANSETRON HCL/PF 4 MG/2 ML
4 VIAL (ML) INJECTION ONCE
Refills: 0 | Status: COMPLETED | OUTPATIENT
Start: 2024-08-10 | End: 2024-08-10

## 2024-08-10 RX ADMIN — Medication 1000 MILLILITER(S): at 22:45

## 2024-08-10 RX ADMIN — Medication 4 MILLIGRAM(S): at 23:03

## 2024-08-10 RX ADMIN — Medication 400 MILLIGRAM(S): at 23:03

## 2024-08-10 NOTE — ED PROVIDER NOTE - PATIENT PORTAL LINK FT
You can access the FollowMyHealth Patient Portal offered by [facility] by registering at the following website: [email or web address]. By joining [facility]’s FollowMyHealth portal, you will also be able to view your health information using other applications (apps) compatible with our system.

## 2024-08-10 NOTE — ED PROVIDER NOTE - ATTENDING APP SHARED VISIT CONTRIBUTION OF CARE
35 yo female  with h/o recurrent pyelonephritis during pregnancy, s/p NVD 03/2024, present to ER c/o right flank pain radiating to her right abdomen x 2 days. Pt also reports pressure with urination and slight nausea. Pt has no h/o kidney stones or gallstones in the past. Denies vomiting, diarrhea, constipation, hematuria. Pt states she had at home abx  that she was prescribed during pregnancy and she started taking it yesterday.  No improvement noted and concerned that pain persists. LMP last week, normal. Pt denies any abnormal vaginal discharge or bleeding.    VSS  Pt afebrile and non-toxic appearing, no signs of acute surgical abdomen on exam. ? pyelonephritis? cholelithiasis? plan for labs, IVF and antiemetics, RUQ US, re-evaluate    wbc 11, ua mildly positive us neg for cholelithiasis  Most likely mild pyelo   will treat with antibiotics   understands return precautions and follow up

## 2024-08-10 NOTE — ED PROVIDER NOTE - NSFOLLOWUPINSTRUCTIONS_ED_ALL_ED_FT
Thank you for visiting [facility].    We saw you today for flank pain  Please know that no emergency visit is complete without follow-up with your primary care provider within  1 week.  Urology consult and further evaluation recommended. Please bring copies of all discharge papers and results and show to your doctor.    Please continue taking all medications as prescribed    I appreciated your patience and hope you feel better soon.   Return to ER immediately if you develop fevers, chills, chest pain, shortness of breath, worsening of flank or abdominal pain, dizziness, and/or any concerning symptoms.    Pyelonephritis, Adult  Body outline showing the urinary system, with a close-up of a normal kidney and an infected kidney.  Pyelonephritis is an infection that occurs in the kidney. The kidneys are the organs that filter the blood and move waste from the bloodstream to the urine. Urine passes out of the kidneys through tubes called ureters and goes into the bladder. There are two main types of this condition:  Acute pyelonephritis. These are infections that come on quickly and without any warning.  Chronic pyelonephritis. These infections last for a long time.  In most cases, the infection clears up with treatment. In more severe cases, an infection can spread to the bloodstream or lead to other problems with the kidneys.    What are the causes?  This condition is often caused by bacteria. The bacteria may travel:  From the bladder up to the kidney. This may happen after you have a bladder infection (cystitis) or urinary tract infection (UTI).  From the bloodstream to the kidney.  What increases the risk?  You are more likely to develop this condition if:  You are female. Your risk is even higher if you are pregnant.  You are older.  You have:  Diabetes.  Prostatitis. This is inflammation of the prostate gland.  Kidney stones or bladder stones.  Problems with your kidneys or ureters.  Cancer.  Spinal cord injury or nerve damage around the bladder.  You have a soft tube (catheter) placed in your bladder.  You are sexually active and use spermicides.  You have or have had a UTI.  What are the signs or symptoms?  Symptoms of this condition include:  An urge to urinate that is strong or does not go away. You may also urinate more often than normal.  A burning or stinging feeling when you urinate.  Pain. This may be in your abdomen, back, side, or groin.  Fever or chills.  Nausea or vomiting.  Urine that is bloody, dark, cloudy, or smells bad.  How is this diagnosed?  This condition may be diagnosed based on your medical history and a physical exam. You may also have tests, such as:  Urine tests.  Blood tests.  Imaging tests of the kidneys. These may include an ultrasound or CT scan.  How is this treated?  Treatment for this condition depends on the severity of the infection.  If the infection is mild and found early, you may be given antibiotics to take by mouth. You will need to drink lots of fluids.  If the infection is more severe, you may need to stay in the hospital and receive antibiotics through an IV. You may also get fluids through an IV. After you leave the hospital, you may need to take antibiotics by mouth.  Other treatments may be needed. These will depend on the cause of the infection.    Follow these instructions at home:  Eating and drinking    Drink enough fluid to keep your urine pale yellow.  Avoid caffeine, tea, and carbonated drinks. These can irritate the bladder.  General instructions    Take over-the-counter and prescription medicines as told by your health care provider. Finish your antibiotics even if you start to feel better.  Urinate often. Avoid holding in urine for long periods of time.  Urinate before and after sex.  If you are female, cleanse from front to back after a bowel movement. Use each tissue only once.  Keep all follow-up visits. Your health care provider will want to make sure your infection is gone.  Contact a health care provider if:  Your symptoms do not get better after 2 days of treatment.  Your symptoms get worse.  You have a fever or chills.  You cannot take your antibiotics.  Get help right away if:  You vomit each time that you eat or drink.  You have severe pain in your back or side.  You are very weak, or you faint.  This information is not intended to replace advice given to you by your health care provider. Make sure you discuss any questions you have with your health care provider.

## 2024-08-10 NOTE — ED PROVIDER NOTE - PROGRESS NOTE DETAILS
results discussed with pt. she feels better after IV toradol and Tylenol, given a dose of abx for UTI. considering h/o recurrent pyelonephritis ( similar symptoms in the past) will d/c home with oral abx. returned precautions discussed.

## 2024-08-10 NOTE — ED ADULT TRIAGE NOTE - WEIGHT IN LBS
Central Line Type: Port  Central Line Site: Right   Port cleansed with ChloraPrep per protocol.  Accessed via: 20 gauge Mendes needle  Patency Verification: Blood return greater or equal to 3 ml   Port flushed with: 20 ml 0.9 normal saline and 100 un/ml- 500 units heparin  Mendes needle removed: Yes  Deaccess/site appearance: Clear (no redness, tenderness, or edema), dressing applied if required  Discharged: Stable and Follow up appointment scheduled  Comment: port accessed in lab with blood draw    Dr. Jones is supervising clinician today.    Administrations This Visit     heparin flush 100 UNIT/ML lock flush 500 Units     Admin Date  11/13/2018 Action  Given Dose  500 Units Route  Intercatheter Administered By  Shari Ruvalcaba RN               240

## 2024-08-10 NOTE — ED PROVIDER NOTE - OBJECTIVE STATEMENT
35 yo female  with h/o recurrent pyelonephritis during pregnancy, s/p NVD 03/2024, present to ER c/o right flank pain radiating to her right abdomen x 2 days. Pt also reports pressure with urination and slight nausea. Pt has no h/o kidney stones or gallstones in the past. Denies vomiting, diarrhea, constipation, hematuria. Pt states she had at home abx  that she was prescribed during pregnancy and she started taking it yesterday.  No improvement noted and concerned that pain persists. LMP last week, normal. Pt denies any abnormal vaginal discharge or bleeding.

## 2024-08-10 NOTE — ED PROVIDER NOTE - CLINICAL SUMMARY MEDICAL DECISION MAKING FREE TEXT BOX
35 yo female  with h/o recurrent pyelonephritis during pregnancy, s/p NVD 03/2024, present to ER c/o right flank pain radiating to her right abdomen x 2 days. Pt also reports pressure with urination and slight nausea. Pt has no h/o kidney stones or gallstones in the past. Denies vomiting, diarrhea, constipation, hematuria. Pt states she had at home abx  that she was prescribed during pregnancy and she started taking it yesterday.  No improvement noted and concerned that pain persists. LMP last week, normal. Pt denies any abnormal vaginal discharge or bleeding  Pt afebrile and non-toxic appearing, no signs of acute surgical abdomen on exam. ? pyelonephritis? cholelithiasis? plan for labs, IVF and antiemetics, RUQ US, re-evaluate

## 2024-08-10 NOTE — ED ADULT NURSE NOTE - OBJECTIVE STATEMENT
pt c/o R flank pain, and "liver pain" for 3 days. denies nausea vomiting or diarrhea at this time. abdomen is soft nondistended. urine is cloudy, endorses dysuria but denies hematuria. lungs clear bilaterally upon auscultation. no use of accessory muscles noted. Denies any chest pain or discomfort at this time. Denies any chest pain or discomfort at this time.

## 2024-08-10 NOTE — ED PROVIDER NOTE - PHYSICAL EXAMINATION
Constitutional: awake and alert, in no acute distress  HEENT: head normocephalic and atraumatic. moist mucous membranes  Eyes: extraocular movements intact, normal conjunctiva  Neck: supple, normal ROM  Cardiovascular: regular rate   Pulmonary: no respiratory distress  Gastrointestinal: abdomen obese, soft, RUQ tender, right CVAT+  Skin: warm, dry, normal for ethnicity  Musculoskeletal: no edema, no deformity, NROM  Neurological: oriented x4, no focal neurologic deficit.   Psychiatric: calm and cooperative, no SI/HI

## 2024-08-11 VITALS
SYSTOLIC BLOOD PRESSURE: 126 MMHG | RESPIRATION RATE: 19 BRPM | DIASTOLIC BLOOD PRESSURE: 78 MMHG | OXYGEN SATURATION: 98 % | HEART RATE: 78 BPM

## 2024-08-11 PROCEDURE — 96375 TX/PRO/DX INJ NEW DRUG ADDON: CPT

## 2024-08-11 PROCEDURE — 99284 EMERGENCY DEPT VISIT MOD MDM: CPT | Mod: 25

## 2024-08-11 PROCEDURE — 80053 COMPREHEN METABOLIC PANEL: CPT

## 2024-08-11 PROCEDURE — 81001 URINALYSIS AUTO W/SCOPE: CPT

## 2024-08-11 PROCEDURE — 85025 COMPLETE CBC W/AUTO DIFF WBC: CPT

## 2024-08-11 PROCEDURE — 83690 ASSAY OF LIPASE: CPT

## 2024-08-11 PROCEDURE — 96374 THER/PROPH/DIAG INJ IV PUSH: CPT

## 2024-08-11 PROCEDURE — 87086 URINE CULTURE/COLONY COUNT: CPT

## 2024-08-11 PROCEDURE — 36415 COLL VENOUS BLD VENIPUNCTURE: CPT

## 2024-08-11 PROCEDURE — 81025 URINE PREGNANCY TEST: CPT

## 2024-08-11 PROCEDURE — 76705 ECHO EXAM OF ABDOMEN: CPT

## 2024-08-11 RX ORDER — CEFPODOXIME PROXETIL 100 MG
1 TABLET ORAL
Qty: 20 | Refills: 0
Start: 2024-08-11 | End: 2024-08-20

## 2024-08-11 RX ADMIN — Medication 100 MILLIGRAM(S): at 00:10

## 2024-08-11 RX ADMIN — Medication 30 MILLIGRAM(S): at 00:10

## 2024-08-28 ENCOUNTER — APPOINTMENT (OUTPATIENT)
Dept: BARIATRICS | Facility: CLINIC | Age: 34
End: 2024-08-28

## 2024-09-11 ENCOUNTER — APPOINTMENT (OUTPATIENT)
Dept: BARIATRICS | Facility: CLINIC | Age: 34
End: 2024-09-11

## 2024-10-03 ENCOUNTER — NON-APPOINTMENT (OUTPATIENT)
Age: 34
End: 2024-10-03

## 2024-10-03 ENCOUNTER — APPOINTMENT (OUTPATIENT)
Dept: BARIATRICS | Facility: CLINIC | Age: 34
End: 2024-10-03

## 2024-10-03 VITALS
DIASTOLIC BLOOD PRESSURE: 89 MMHG | TEMPERATURE: 97.1 F | BODY MASS INDEX: 40.35 KG/M2 | SYSTOLIC BLOOD PRESSURE: 138 MMHG | OXYGEN SATURATION: 99 % | WEIGHT: 251.06 LBS | HEIGHT: 66 IN | HEART RATE: 93 BPM

## 2024-10-03 DIAGNOSIS — E66.01 MORBID (SEVERE) OBESITY DUE TO EXCESS CALORIES: ICD-10-CM

## 2024-10-03 DIAGNOSIS — G47.33 OBSTRUCTIVE SLEEP APNEA (ADULT) (PEDIATRIC): ICD-10-CM

## 2024-10-03 DIAGNOSIS — Z00.00 ENCOUNTER FOR GENERAL ADULT MEDICAL EXAMINATION W/OUT ABNORMAL FINDINGS: ICD-10-CM

## 2024-10-03 PROCEDURE — 99204 OFFICE O/P NEW MOD 45 MIN: CPT

## 2024-10-03 NOTE — ASSESSMENT
[FreeTextEntry1] : Patient is a 35 y/o F with history of morbid obesity, borderline diabetes, and CHLOE who returns today interested in bariatric surgery. Patient states that she was previously reluctant to pursue bariatric surgery but is comfortable moving forward now. We discussed at length surgical and non-surgical options and that non-surgical approaches are unlikely to lead to long term, sustained weight loss. We also discussed that surgery alone is unlikely to be successful but should rather be seen as a tool for weight loss to be integrated with physical activity and nutritional counseling. All risks of surgery were explained to the patient including the risks of leaks, infection, blood clots, and death. The patient verbalized understanding and agreed to proceed with the evaluation. Will restart bariatric workup to move forward with possible VSG.

## 2024-10-03 NOTE — ADDENDUM
[FreeTextEntry1] :  Documented by Enrique Salinas acting as a scribe for WES Holt  on 10/03/2024  .

## 2024-10-03 NOTE — END OF VISIT
[FreeTextEntry3] :  All medical record entries made by the Scribe were at my, WES Fajardo , direction and personally dictated by me on 10/03/2024 . I have reviewed the chart and agree that the record accurately reflects my personal performance of the history, physical exam, assessment and plan. I have also personally directed, reviewed, and agreed with the chart.  [Time Spent: ___ minutes] : I have spent [unfilled] minutes of time on the encounter which excludes teaching and separately reported services.

## 2024-10-03 NOTE — HISTORY OF PRESENT ILLNESS
[de-identified] : Patient is a 33 y/o F with history of morbid obesity, borderline diabetes, and CHLOE who returns today interested in bariatric surgery. Patient states that she was previously reluctant to pursue bariatric surgery but is comfortable moving forward now. We discussed at length surgical and non-surgical options and that non-surgical approaches are unlikely to lead to long term, sustained weight loss. We also discussed that surgery alone is unlikely to be successful but should rather be seen as a tool for weight loss to be integrated with physical activity and nutritional counseling. All risks of surgery were explained to the patient including the risks of leaks, infection, blood clots, and death. The patient verbalized understanding and agreed to proceed with the evaluation. Will restart bariatric workup to move forward with possible VSG.

## 2024-10-04 LAB
25(OH)D3 SERPL-MCNC: 8.9 NG/ML
ALBUMIN SERPL ELPH-MCNC: 3.9 G/DL
ALP BLD-CCNC: 90 U/L
ALT SERPL-CCNC: 10 U/L
ANION GAP SERPL CALC-SCNC: 13 MMOL/L
APPEARANCE: CLEAR
AST SERPL-CCNC: 14 U/L
BACTERIA: NEGATIVE /HPF
BASOPHILS # BLD AUTO: 0.03 K/UL
BASOPHILS NFR BLD AUTO: 0.4 %
BILIRUB SERPL-MCNC: 0.2 MG/DL
BILIRUBIN URINE: NEGATIVE
BLOOD URINE: ABNORMAL
BUN SERPL-MCNC: 8 MG/DL
CALCIUM SERPL-MCNC: 8.9 MG/DL
CALCIUM SERPL-MCNC: 8.9 MG/DL
CAST: 0 /LPF
CHLORIDE SERPL-SCNC: 105 MMOL/L
CHOLEST SERPL-MCNC: 217 MG/DL
CO2 SERPL-SCNC: 23 MMOL/L
COLOR: YELLOW
CREAT SERPL-MCNC: 0.66 MG/DL
EGFR: 117 ML/MIN/1.73M2
EOSINOPHIL # BLD AUTO: 0.12 K/UL
EOSINOPHIL NFR BLD AUTO: 1.6 %
EPITHELIAL CELLS: 2 /HPF
ESTIMATED AVERAGE GLUCOSE: 128 MG/DL
FOLATE SERPL-MCNC: 10.3 NG/ML
GLUCOSE QUALITATIVE U: NEGATIVE MG/DL
GLUCOSE SERPL-MCNC: 114 MG/DL
HBA1C MFR BLD HPLC: 6.1 %
HCG SERPL QL: POSITIVE
HCT VFR BLD CALC: 33.8 %
HDLC SERPL-MCNC: 57 MG/DL
HGB BLD-MCNC: 10.1 G/DL
IMM GRANULOCYTES NFR BLD AUTO: 0.4 %
INR PPP: 0.93 RATIO
IRON SATN MFR SERPL: 6 %
IRON SERPL-MCNC: 28 UG/DL
KETONES URINE: NEGATIVE MG/DL
LDLC SERPL CALC-MCNC: 148 MG/DL
LEUKOCYTE ESTERASE URINE: ABNORMAL
LYMPHOCYTES # BLD AUTO: 2.36 K/UL
LYMPHOCYTES NFR BLD AUTO: 30.9 %
MAN DIFF?: NORMAL
MCHC RBC-ENTMCNC: 23.7 PG
MCHC RBC-ENTMCNC: 29.9 GM/DL
MCV RBC AUTO: 79.3 FL
MICROSCOPIC-UA: NORMAL
MONOCYTES # BLD AUTO: 0.48 K/UL
MONOCYTES NFR BLD AUTO: 6.3 %
NEUTROPHILS # BLD AUTO: 4.61 K/UL
NEUTROPHILS NFR BLD AUTO: 60.4 %
NITRITE URINE: NEGATIVE
NONHDLC SERPL-MCNC: 160 MG/DL
PAPP-A SERPL-ACNC: 3020 MIU/ML
PARATHYROID HORMONE INTACT: 89 PG/ML
PH URINE: 7
PLATELET # BLD AUTO: 357 K/UL
POTASSIUM SERPL-SCNC: 4.2 MMOL/L
PREALB SERPL NEPH-MCNC: 25 MG/DL
PROT SERPL-MCNC: 7.2 G/DL
PROTEIN URINE: NEGATIVE MG/DL
PT BLD: 11 SEC
RBC # BLD: 4.26 M/UL
RBC # FLD: 18.6 %
RED BLOOD CELLS URINE: 18 /HPF
SODIUM SERPL-SCNC: 141 MMOL/L
SPECIFIC GRAVITY URINE: 1.02
TIBC SERPL-MCNC: 460 UG/DL
TRIGL SERPL-MCNC: 70 MG/DL
TSH SERPL-ACNC: 0.74 UIU/ML
UIBC SERPL-MCNC: 432 UG/DL
UROBILINOGEN URINE: 1 MG/DL
VIT B12 SERPL-MCNC: 386 PG/ML
WBC # FLD AUTO: 7.63 K/UL
WHITE BLOOD CELLS URINE: 2 /HPF

## 2024-10-07 ENCOUNTER — APPOINTMENT (OUTPATIENT)
Dept: BARIATRICS | Facility: CLINIC | Age: 34
End: 2024-10-07

## 2024-10-08 ENCOUNTER — NON-APPOINTMENT (OUTPATIENT)
Age: 34
End: 2024-10-08

## 2024-10-08 DIAGNOSIS — E55.9 VITAMIN D DEFICIENCY, UNSPECIFIED: ICD-10-CM

## 2024-10-08 LAB
CA-I SERPL-SCNC: 4.8 MG/DL
UREA BREATH TEST QL: NEGATIVE
VIT B1 SERPL-MCNC: 73.9 NMOL/L
ZINC SERPL-MCNC: 71 UG/DL

## 2024-10-08 RX ORDER — ERGOCALCIFEROL 1.25 MG/1
1.25 MG CAPSULE ORAL
Qty: 12 | Refills: 0 | Status: ACTIVE | COMMUNITY
Start: 2024-10-08 | End: 1900-01-01

## 2024-10-11 LAB
A-TOCOPHEROL VIT E SERPL-MCNC: 9.8 MG/L
BETA+GAMMA TOCOPHEROL SERPL-MCNC: 1.3 MG/L

## 2024-10-18 ENCOUNTER — APPOINTMENT (OUTPATIENT)
Dept: BARIATRICS | Facility: CLINIC | Age: 34
End: 2024-10-18

## 2024-10-23 ENCOUNTER — APPOINTMENT (OUTPATIENT)
Dept: INTERNAL MEDICINE | Facility: CLINIC | Age: 34
End: 2024-10-23

## 2024-11-15 ENCOUNTER — APPOINTMENT (OUTPATIENT)
Dept: BARIATRICS | Facility: CLINIC | Age: 34
End: 2024-11-15

## 2024-11-15 PROBLEM — Z01.811 PREOP PULMONARY/RESPIRATORY EXAM: Status: RESOLVED | Noted: 2023-02-13 | Resolved: 2024-11-15

## 2024-11-15 PROBLEM — R73.03 BORDERLINE DIABETES: Status: RESOLVED | Noted: 2020-11-16 | Resolved: 2024-11-15

## 2024-11-15 PROBLEM — R73.03 PREDIABETES: Status: ACTIVE | Noted: 2024-11-15

## 2024-11-15 PROBLEM — R73.03 PREDIABETES: Status: RESOLVED | Noted: 2020-12-16 | Resolved: 2024-11-15

## 2024-11-15 PROBLEM — E66.01 SEVERE OBESITY (BMI >= 40): Status: RESOLVED | Noted: 2020-11-16 | Resolved: 2024-11-15

## 2024-11-19 ENCOUNTER — APPOINTMENT (OUTPATIENT)
Dept: HEART AND VASCULAR | Facility: CLINIC | Age: 34
End: 2024-11-19

## 2024-11-19 DIAGNOSIS — E66.01 MORBID (SEVERE) OBESITY DUE TO EXCESS CALORIES: ICD-10-CM

## 2024-11-19 DIAGNOSIS — R73.03 PREDIABETES.: ICD-10-CM

## 2024-11-19 DIAGNOSIS — R94.31 ABNORMAL ELECTROCARDIOGRAM [ECG] [EKG]: ICD-10-CM

## 2024-11-19 DIAGNOSIS — G47.33 OBSTRUCTIVE SLEEP APNEA (ADULT) (PEDIATRIC): ICD-10-CM

## 2024-11-19 DIAGNOSIS — Z01.811 ENCOUNTER FOR PREPROCEDURAL RESPIRATORY EXAMINATION: ICD-10-CM

## 2024-11-25 ENCOUNTER — APPOINTMENT (OUTPATIENT)
Dept: INTERNAL MEDICINE | Facility: CLINIC | Age: 34
End: 2024-11-25

## 2024-12-01 NOTE — ED ADULT NURSE NOTE - NS ED NOTE ABUSE SUSPICION NEGLECT YN
Goal Outcome Evaluation:           Progress: no change  Outcome Evaluation: Patient still remains on 2 L nasal cannula. She refused to wear BIPAP (v60 pulled from room). No respiratory distress noted, patient resting comfortably.                              No

## 2024-12-04 ENCOUNTER — APPOINTMENT (OUTPATIENT)
Dept: INTERNAL MEDICINE | Facility: CLINIC | Age: 34
End: 2024-12-04
Payer: MEDICAID

## 2024-12-04 VITALS
HEART RATE: 102 BPM | SYSTOLIC BLOOD PRESSURE: 122 MMHG | WEIGHT: 262.35 LBS | DIASTOLIC BLOOD PRESSURE: 85 MMHG | HEIGHT: 66 IN | BODY MASS INDEX: 42.16 KG/M2 | OXYGEN SATURATION: 97 % | TEMPERATURE: 97.3 F

## 2024-12-04 DIAGNOSIS — N62 HYPERTROPHY OF BREAST: ICD-10-CM

## 2024-12-04 DIAGNOSIS — Z83.3 FAMILY HISTORY OF DIABETES MELLITUS: ICD-10-CM

## 2024-12-04 DIAGNOSIS — Z82.49 FAMILY HISTORY OF ISCHEMIC HEART DISEASE AND OTHER DISEASES OF THE CIRCULATORY SYSTEM: ICD-10-CM

## 2024-12-04 DIAGNOSIS — E66.01 MORBID (SEVERE) OBESITY DUE TO EXCESS CALORIES: ICD-10-CM

## 2024-12-04 DIAGNOSIS — Z32.01 ENCOUNTER FOR PREGNANCY TEST, RESULT POSITIVE: ICD-10-CM

## 2024-12-04 DIAGNOSIS — D50.9 IRON DEFICIENCY ANEMIA, UNSPECIFIED: ICD-10-CM

## 2024-12-04 PROCEDURE — 99204 OFFICE O/P NEW MOD 45 MIN: CPT

## 2024-12-04 RX ORDER — FERROUS SULFATE 325(65) MG
325 (65 FE) TABLET ORAL DAILY
Qty: 90 | Refills: 1 | Status: ACTIVE | COMMUNITY
Start: 2024-12-04 | End: 1900-01-01

## 2024-12-09 ENCOUNTER — TRANSCRIPTION ENCOUNTER (OUTPATIENT)
Age: 34
End: 2024-12-09

## 2024-12-09 LAB
25(OH)D3 SERPL-MCNC: 22.9 NG/ML
BASOPHILS # BLD AUTO: 0.03 K/UL
BASOPHILS NFR BLD AUTO: 0.4 %
EOSINOPHIL # BLD AUTO: 0.11 K/UL
EOSINOPHIL NFR BLD AUTO: 1.3 %
FERRITIN SERPL-MCNC: 18 NG/ML
HCG SERPL-MCNC: <1 MIU/ML
HCT VFR BLD CALC: 38.8 %
HGB BLD-MCNC: 11.9 G/DL
IMM GRANULOCYTES NFR BLD AUTO: 0.4 %
IRON SATN MFR SERPL: 7 %
IRON SERPL-MCNC: 36 UG/DL
LYMPHOCYTES # BLD AUTO: 2.91 K/UL
LYMPHOCYTES NFR BLD AUTO: 34.1 %
MAN DIFF?: NORMAL
MCHC RBC-ENTMCNC: 23.6 PG
MCHC RBC-ENTMCNC: 30.7 G/DL
MCV RBC AUTO: 76.8 FL
MONOCYTES # BLD AUTO: 0.66 K/UL
MONOCYTES NFR BLD AUTO: 7.7 %
NEUTROPHILS # BLD AUTO: 4.79 K/UL
NEUTROPHILS NFR BLD AUTO: 56.1 %
PLATELET # BLD AUTO: 405 K/UL
RBC # BLD: 5.05 M/UL
RBC # FLD: 15.1 %
TIBC SERPL-MCNC: 492 UG/DL
UIBC SERPL-MCNC: 456 UG/DL
WBC # FLD AUTO: 8.53 K/UL

## 2024-12-20 ENCOUNTER — APPOINTMENT (OUTPATIENT)
Dept: BARIATRICS | Facility: CLINIC | Age: 34
End: 2024-12-20

## 2024-12-20 VITALS — WEIGHT: 240 LBS | BODY MASS INDEX: 38.57 KG/M2 | HEIGHT: 66 IN

## 2024-12-20 PROCEDURE — 97802 MEDICAL NUTRITION INDIV IN: CPT | Mod: 95

## 2025-01-17 ENCOUNTER — APPOINTMENT (OUTPATIENT)
Dept: BARIATRICS | Facility: CLINIC | Age: 35
End: 2025-01-17

## 2025-01-17 ENCOUNTER — NON-APPOINTMENT (OUTPATIENT)
Age: 35
End: 2025-01-17

## 2025-01-17 VITALS — WEIGHT: 220 LBS | BODY MASS INDEX: 35.36 KG/M2 | HEIGHT: 66 IN

## 2025-01-17 PROCEDURE — 97803 MED NUTRITION INDIV SUBSEQ: CPT | Mod: 93

## 2025-01-22 PROBLEM — E78.5 DYSLIPIDEMIA: Status: ACTIVE | Noted: 2025-01-22

## 2025-01-23 ENCOUNTER — APPOINTMENT (OUTPATIENT)
Dept: PULMONOLOGY | Facility: CLINIC | Age: 35
End: 2025-01-23

## 2025-01-29 ENCOUNTER — APPOINTMENT (OUTPATIENT)
Dept: HEART AND VASCULAR | Facility: CLINIC | Age: 35
End: 2025-01-29

## 2025-01-29 DIAGNOSIS — R73.03 PREDIABETES.: ICD-10-CM

## 2025-01-29 DIAGNOSIS — E78.5 HYPERLIPIDEMIA, UNSPECIFIED: ICD-10-CM

## 2025-01-29 DIAGNOSIS — E66.01 MORBID (SEVERE) OBESITY DUE TO EXCESS CALORIES: ICD-10-CM

## 2025-01-29 DIAGNOSIS — G47.33 OBSTRUCTIVE SLEEP APNEA (ADULT) (PEDIATRIC): ICD-10-CM

## 2025-01-29 DIAGNOSIS — R94.31 ABNORMAL ELECTROCARDIOGRAM [ECG] [EKG]: ICD-10-CM

## 2025-02-10 ENCOUNTER — APPOINTMENT (OUTPATIENT)
Dept: OBGYN | Facility: CLINIC | Age: 35
End: 2025-02-10

## 2025-03-04 ENCOUNTER — APPOINTMENT (OUTPATIENT)
Dept: HEART AND VASCULAR | Facility: CLINIC | Age: 35
End: 2025-03-04

## 2025-03-18 PROBLEM — Z01.810 PREOP CARDIOVASCULAR EXAM: Status: ACTIVE | Noted: 2025-03-18

## 2025-03-19 ENCOUNTER — APPOINTMENT (OUTPATIENT)
Dept: HEART AND VASCULAR | Facility: CLINIC | Age: 35
End: 2025-03-19

## 2025-03-19 DIAGNOSIS — Z01.810 ENCOUNTER FOR PREPROCEDURAL CARDIOVASCULAR EXAMINATION: ICD-10-CM

## 2025-04-02 ENCOUNTER — APPOINTMENT (OUTPATIENT)
Dept: INTERNAL MEDICINE | Facility: CLINIC | Age: 35
End: 2025-04-02

## 2025-07-31 ENCOUNTER — APPOINTMENT (OUTPATIENT)
Dept: BARIATRICS | Facility: CLINIC | Age: 35
End: 2025-07-31
Payer: MEDICAID

## 2025-07-31 PROCEDURE — 99214 OFFICE O/P EST MOD 30 MIN: CPT | Mod: 95

## 2025-08-19 ENCOUNTER — APPOINTMENT (OUTPATIENT)
Dept: RADIOLOGY | Facility: HOSPITAL | Age: 35
End: 2025-08-19

## 2025-08-22 ENCOUNTER — OUTPATIENT (OUTPATIENT)
Dept: OUTPATIENT SERVICES | Facility: HOSPITAL | Age: 35
LOS: 1 days | End: 2025-08-22
Payer: MEDICAID

## 2025-08-22 ENCOUNTER — APPOINTMENT (OUTPATIENT)
Dept: INTERNAL MEDICINE | Facility: CLINIC | Age: 35
End: 2025-08-22
Payer: MEDICAID

## 2025-08-22 ENCOUNTER — RESULT REVIEW (OUTPATIENT)
Age: 35
End: 2025-08-22

## 2025-08-22 VITALS
HEART RATE: 81 BPM | TEMPERATURE: 97.9 F | SYSTOLIC BLOOD PRESSURE: 130 MMHG | DIASTOLIC BLOOD PRESSURE: 83 MMHG | OXYGEN SATURATION: 98 % | HEIGHT: 66 IN | WEIGHT: 220 LBS | BODY MASS INDEX: 35.36 KG/M2

## 2025-08-22 DIAGNOSIS — J45.909 UNSPECIFIED ASTHMA, UNCOMPLICATED: ICD-10-CM

## 2025-08-22 DIAGNOSIS — D50.9 IRON DEFICIENCY ANEMIA, UNSPECIFIED: ICD-10-CM

## 2025-08-22 DIAGNOSIS — R73.03 PREDIABETES.: ICD-10-CM

## 2025-08-22 DIAGNOSIS — G47.33 OBSTRUCTIVE SLEEP APNEA (ADULT) (PEDIATRIC): ICD-10-CM

## 2025-08-22 PROCEDURE — 93000 ELECTROCARDIOGRAM COMPLETE: CPT

## 2025-08-22 PROCEDURE — 71046 X-RAY EXAM CHEST 2 VIEWS: CPT | Mod: 26

## 2025-08-22 PROCEDURE — 99214 OFFICE O/P EST MOD 30 MIN: CPT | Mod: 25

## 2025-08-22 RX ORDER — ALBUTEROL SULFATE 90 UG/1
108 (90 BASE) INHALANT RESPIRATORY (INHALATION)
Refills: 0 | Status: ACTIVE | COMMUNITY
Start: 2025-08-22

## 2025-08-25 VITALS
RESPIRATION RATE: 18 BRPM | SYSTOLIC BLOOD PRESSURE: 130 MMHG | OXYGEN SATURATION: 95 % | DIASTOLIC BLOOD PRESSURE: 86 MMHG | HEIGHT: 66 IN | TEMPERATURE: 97 F | HEART RATE: 91 BPM | WEIGHT: 274.03 LBS

## 2025-08-25 LAB
ALBUMIN SERPL ELPH-MCNC: 4.2 G/DL
ALP BLD-CCNC: 94 U/L
ALT SERPL-CCNC: 11 U/L
ANION GAP SERPL CALC-SCNC: 14 MMOL/L
APTT BLD: 31.4 SEC
AST SERPL-CCNC: 9 U/L
BASOPHILS # BLD AUTO: 0.04 K/UL
BASOPHILS NFR BLD AUTO: 0.5 %
BILIRUB SERPL-MCNC: 0.3 MG/DL
BUN SERPL-MCNC: 6 MG/DL
CALCIUM SERPL-MCNC: 9.2 MG/DL
CHLORIDE SERPL-SCNC: 103 MMOL/L
CO2 SERPL-SCNC: 21 MMOL/L
CREAT SERPL-MCNC: 0.7 MG/DL
EGFRCR SERPLBLD CKD-EPI 2021: 115 ML/MIN/1.73M2
EOSINOPHIL # BLD AUTO: 0.12 K/UL
EOSINOPHIL NFR BLD AUTO: 1.6 %
ESTIMATED AVERAGE GLUCOSE: 186 MG/DL
FERRITIN SERPL-MCNC: 10 NG/ML
GLUCOSE SERPL-MCNC: 203 MG/DL
HBA1C MFR BLD HPLC: 8.1 %
HCG SERPL-MCNC: <1 MIU/ML
HCT VFR BLD CALC: 31.9 %
HGB BLD-MCNC: 9.2 G/DL
IMM GRANULOCYTES NFR BLD AUTO: 0.3 %
INR PPP: 0.97 RATIO
IRON SATN MFR SERPL: 4 %
IRON SERPL-MCNC: 20 UG/DL
LYMPHOCYTES # BLD AUTO: 2.75 K/UL
LYMPHOCYTES NFR BLD AUTO: 36.7 %
MAN DIFF?: NORMAL
MCHC RBC-ENTMCNC: 21.2 PG
MCHC RBC-ENTMCNC: 28.8 G/DL
MCV RBC AUTO: 73.5 FL
MONOCYTES # BLD AUTO: 0.53 K/UL
MONOCYTES NFR BLD AUTO: 7.1 %
NEUTROPHILS # BLD AUTO: 4.04 K/UL
NEUTROPHILS NFR BLD AUTO: 53.8 %
PLATELET # BLD AUTO: 392 K/UL
POTASSIUM SERPL-SCNC: 4.4 MMOL/L
PROT SERPL-MCNC: 7.8 G/DL
PT BLD: 11.2 SEC
RBC # BLD: 4.34 M/UL
RBC # FLD: 16.5 %
SODIUM SERPL-SCNC: 139 MMOL/L
TIBC SERPL-MCNC: 450 UG/DL
UIBC SERPL-MCNC: 430 UG/DL
WBC # FLD AUTO: 7.5 K/UL

## 2025-08-25 RX ADMIN — FERUMOXYTOL 1020 MG/17ML: 510 INJECTION INTRAVENOUS at 00:00

## 2025-08-26 ENCOUNTER — RESULT REVIEW (OUTPATIENT)
Age: 35
End: 2025-08-26

## 2025-08-26 ENCOUNTER — TRANSCRIPTION ENCOUNTER (OUTPATIENT)
Age: 35
End: 2025-08-26

## 2025-08-26 ENCOUNTER — APPOINTMENT (OUTPATIENT)
Dept: BARIATRICS | Facility: HOSPITAL | Age: 35
End: 2025-08-26

## 2025-08-26 ENCOUNTER — INPATIENT (INPATIENT)
Facility: HOSPITAL | Age: 35
LOS: 0 days | Discharge: HOME | End: 2025-08-27
Attending: GENERAL ACUTE CARE HOSPITAL | Admitting: GENERAL ACUTE CARE HOSPITAL
Payer: COMMERCIAL

## 2025-08-26 LAB
BLD GP AB SCN SERPL QL: NEGATIVE — SIGNIFICANT CHANGE UP
HCT VFR BLD CALC: 28.6 % — LOW (ref 34.5–45)
HGB BLD-MCNC: 8.5 G/DL — LOW (ref 11.5–15.5)
MCHC RBC-ENTMCNC: 20.6 PG — LOW (ref 27–34)
MCHC RBC-ENTMCNC: 29.7 G/DL — LOW (ref 32–36)
MCV RBC AUTO: 69.4 FL — LOW (ref 80–100)
NRBC # BLD AUTO: 0 K/UL — SIGNIFICANT CHANGE UP (ref 0–0)
NRBC # FLD: 0 K/UL — SIGNIFICANT CHANGE UP (ref 0–0)
NRBC BLD AUTO-RTO: 0 /100 WBCS — SIGNIFICANT CHANGE UP (ref 0–0)
PLATELET # BLD AUTO: 369 K/UL — SIGNIFICANT CHANGE UP (ref 150–400)
PMV BLD: 8.9 FL — SIGNIFICANT CHANGE UP (ref 7–13)
RBC # BLD: 4.12 M/UL — SIGNIFICANT CHANGE UP (ref 3.8–5.2)
RBC # FLD: 16 % — HIGH (ref 10.3–14.5)
RH IG SCN BLD-IMP: POSITIVE — SIGNIFICANT CHANGE UP
WBC # BLD: 11.65 K/UL — HIGH (ref 3.8–10.5)
WBC # FLD AUTO: 11.65 K/UL — HIGH (ref 3.8–10.5)

## 2025-08-26 PROCEDURE — 43775 LAP SLEEVE GASTRECTOMY: CPT

## 2025-08-26 PROCEDURE — 86850 RBC ANTIBODY SCREEN: CPT

## 2025-08-26 PROCEDURE — 86901 BLOOD TYPING SEROLOGIC RH(D): CPT

## 2025-08-26 PROCEDURE — 43775 LAP SLEEVE GASTRECTOMY: CPT | Mod: AS

## 2025-08-26 PROCEDURE — 86900 BLOOD TYPING SEROLOGIC ABO: CPT

## 2025-08-26 PROCEDURE — 88307 TISSUE EXAM BY PATHOLOGIST: CPT | Mod: 26

## 2025-08-26 PROCEDURE — 94640 AIRWAY INHALATION TREATMENT: CPT

## 2025-08-26 DEVICE — XI STAPLER SUREFORM RELOAD 60 BLUE: Type: IMPLANTABLE DEVICE | Status: FUNCTIONAL

## 2025-08-26 RX ORDER — ALBUTEROL SULFATE 2.5 MG/3ML
2 VIAL, NEBULIZER (ML) INHALATION EVERY 12 HOURS
Refills: 0 | Status: DISCONTINUED | OUTPATIENT
Start: 2025-08-26 | End: 2025-08-27

## 2025-08-26 RX ORDER — APREPITANT 40 MG/1
80 CAPSULE ORAL ONCE
Refills: 0 | Status: COMPLETED | OUTPATIENT
Start: 2025-08-26 | End: 2025-08-26

## 2025-08-26 RX ORDER — TRANEXAMIC ACID 1000 MG/10
1000 AMPUL (ML) INTRAVENOUS ONCE
Refills: 0 | Status: COMPLETED | OUTPATIENT
Start: 2025-08-26 | End: 2025-08-26

## 2025-08-26 RX ORDER — IRON SUCROSE 20 MG/ML
500 INJECTION, SOLUTION INTRAVENOUS EVERY 24 HOURS
Refills: 0 | Status: COMPLETED | OUTPATIENT
Start: 2025-08-26 | End: 2025-08-27

## 2025-08-26 RX ORDER — HYDROMORPHONE/SOD CHLOR,ISO/PF 2 MG/10 ML
0.5 SYRINGE (ML) INJECTION ONCE
Refills: 0 | Status: DISCONTINUED | OUTPATIENT
Start: 2025-08-26 | End: 2025-08-26

## 2025-08-26 RX ORDER — OXYCODONE HYDROCHLORIDE 30 MG/1
2.5 TABLET ORAL EVERY 6 HOURS
Refills: 0 | Status: DISCONTINUED | OUTPATIENT
Start: 2025-08-26 | End: 2025-08-27

## 2025-08-26 RX ORDER — ENOXAPARIN SODIUM 100 MG/ML
40 INJECTION SUBCUTANEOUS ONCE
Refills: 0 | Status: COMPLETED | OUTPATIENT
Start: 2025-08-26 | End: 2025-08-26

## 2025-08-26 RX ORDER — SODIUM CHLORIDE 9 G/1000ML
1000 INJECTION, SOLUTION INTRAVENOUS
Refills: 0 | Status: DISCONTINUED | OUTPATIENT
Start: 2025-08-26 | End: 2025-08-27

## 2025-08-26 RX ORDER — OXYCODONE HYDROCHLORIDE 30 MG/1
5 TABLET ORAL EVERY 6 HOURS
Refills: 0 | Status: DISCONTINUED | OUTPATIENT
Start: 2025-08-26 | End: 2025-08-27

## 2025-08-26 RX ORDER — HYDROMORPHONE/SOD CHLOR,ISO/PF 2 MG/10 ML
0.5 SYRINGE (ML) INJECTION
Refills: 0 | Status: DISCONTINUED | OUTPATIENT
Start: 2025-08-26 | End: 2025-08-26

## 2025-08-26 RX ORDER — ACETAMINOPHEN 500 MG/5ML
1000 LIQUID (ML) ORAL ONCE
Refills: 0 | Status: COMPLETED | OUTPATIENT
Start: 2025-08-26 | End: 2025-08-26

## 2025-08-26 RX ORDER — IPRATROPIUM BROMIDE AND ALBUTEROL SULFATE .5; 2.5 MG/3ML; MG/3ML
3 SOLUTION RESPIRATORY (INHALATION) EVERY 6 HOURS
Refills: 0 | Status: DISCONTINUED | OUTPATIENT
Start: 2025-08-26 | End: 2025-08-27

## 2025-08-26 RX ORDER — ONDANSETRON HCL/PF 4 MG/2 ML
4 VIAL (ML) INJECTION EVERY 6 HOURS
Refills: 0 | Status: DISCONTINUED | OUTPATIENT
Start: 2025-08-26 | End: 2025-08-27

## 2025-08-26 RX ORDER — SCOPOLAMINE 1 MG/3D
1 PATCH, EXTENDED RELEASE TRANSDERMAL ONCE
Refills: 0 | Status: COMPLETED | OUTPATIENT
Start: 2025-08-26 | End: 2025-08-26

## 2025-08-26 RX ORDER — ACETAMINOPHEN 500 MG/5ML
1000 LIQUID (ML) ORAL EVERY 6 HOURS
Refills: 0 | Status: DISCONTINUED | OUTPATIENT
Start: 2025-08-26 | End: 2025-08-27

## 2025-08-26 RX ADMIN — Medication 220 MILLIGRAM(S): at 22:46

## 2025-08-26 RX ADMIN — Medication 105 MILLIGRAM(S): at 15:31

## 2025-08-26 RX ADMIN — SCOPOLAMINE 1 PATCH: 1 PATCH, EXTENDED RELEASE TRANSDERMAL at 18:07

## 2025-08-26 RX ADMIN — Medication 1000 MILLIGRAM(S): at 17:36

## 2025-08-26 RX ADMIN — OXYCODONE HYDROCHLORIDE 5 MILLIGRAM(S): 30 TABLET ORAL at 15:10

## 2025-08-26 RX ADMIN — Medication 1000 MILLIGRAM(S): at 23:20

## 2025-08-26 RX ADMIN — Medication 0.5 MILLIGRAM(S): at 14:25

## 2025-08-26 RX ADMIN — Medication 0.5 MILLIGRAM(S): at 14:07

## 2025-08-26 RX ADMIN — IPRATROPIUM BROMIDE AND ALBUTEROL SULFATE 3 MILLILITER(S): .5; 2.5 SOLUTION RESPIRATORY (INHALATION) at 18:01

## 2025-08-26 RX ADMIN — Medication 0.5 MILLIGRAM(S): at 14:46

## 2025-08-26 RX ADMIN — IRON SUCROSE 68.75 MILLIGRAM(S): 20 INJECTION, SOLUTION INTRAVENOUS at 15:31

## 2025-08-26 RX ADMIN — ENOXAPARIN SODIUM 40 MILLIGRAM(S): 100 INJECTION SUBCUTANEOUS at 11:21

## 2025-08-26 RX ADMIN — Medication 0.5 MILLIGRAM(S): at 16:46

## 2025-08-26 RX ADMIN — Medication 40 MILLIGRAM(S): at 14:28

## 2025-08-26 RX ADMIN — OXYCODONE HYDROCHLORIDE 5 MILLIGRAM(S): 30 TABLET ORAL at 15:39

## 2025-08-26 RX ADMIN — Medication 0.5 MILLIGRAM(S): at 14:28

## 2025-08-26 RX ADMIN — Medication 0.5 MILLIGRAM(S): at 14:59

## 2025-08-26 RX ADMIN — Medication 0.5 MILLIGRAM(S): at 17:01

## 2025-08-26 RX ADMIN — APREPITANT 80 MILLIGRAM(S): 40 CAPSULE ORAL at 11:22

## 2025-08-26 RX ADMIN — Medication 1000 MILLIGRAM(S): at 11:22

## 2025-08-26 RX ADMIN — IPRATROPIUM BROMIDE AND ALBUTEROL SULFATE 3 MILLILITER(S): .5; 2.5 SOLUTION RESPIRATORY (INHALATION) at 23:19

## 2025-08-26 RX ADMIN — SCOPOLAMINE 1 PATCH: 1 PATCH, EXTENDED RELEASE TRANSDERMAL at 11:22

## 2025-08-27 ENCOUNTER — TRANSCRIPTION ENCOUNTER (OUTPATIENT)
Age: 35
End: 2025-08-27

## 2025-08-27 VITALS
TEMPERATURE: 98 F | SYSTOLIC BLOOD PRESSURE: 134 MMHG | HEART RATE: 80 BPM | OXYGEN SATURATION: 97 % | DIASTOLIC BLOOD PRESSURE: 78 MMHG | RESPIRATION RATE: 18 BRPM

## 2025-08-27 LAB
ANION GAP SERPL CALC-SCNC: 12 MMOL/L — SIGNIFICANT CHANGE UP (ref 5–17)
BASOPHILS # BLD AUTO: 0.02 K/UL — SIGNIFICANT CHANGE UP (ref 0–0.2)
BASOPHILS NFR BLD AUTO: 0.2 % — SIGNIFICANT CHANGE UP (ref 0–2)
BUN SERPL-MCNC: 6 MG/DL — LOW (ref 7–23)
CALCIUM SERPL-MCNC: 8.6 MG/DL — SIGNIFICANT CHANGE UP (ref 8.4–10.5)
CHLORIDE SERPL-SCNC: 102 MMOL/L — SIGNIFICANT CHANGE UP (ref 96–108)
CO2 SERPL-SCNC: 23 MMOL/L — SIGNIFICANT CHANGE UP (ref 22–31)
CREAT SERPL-MCNC: 0.72 MG/DL — SIGNIFICANT CHANGE UP (ref 0.5–1.3)
EGFR: 111 ML/MIN/1.73M2 — SIGNIFICANT CHANGE UP
EGFR: 111 ML/MIN/1.73M2 — SIGNIFICANT CHANGE UP
EOSINOPHIL # BLD AUTO: 0.03 K/UL — SIGNIFICANT CHANGE UP (ref 0–0.5)
EOSINOPHIL NFR BLD AUTO: 0.3 % — SIGNIFICANT CHANGE UP (ref 0–6)
GLUCOSE SERPL-MCNC: 149 MG/DL — HIGH (ref 70–99)
HCT VFR BLD CALC: 28 % — LOW (ref 34.5–45)
HGB BLD-MCNC: 8.3 G/DL — LOW (ref 11.5–15.5)
IMM GRANULOCYTES # BLD AUTO: 0.02 K/UL — SIGNIFICANT CHANGE UP (ref 0–0.07)
IMM GRANULOCYTES NFR BLD AUTO: 0.2 % — SIGNIFICANT CHANGE UP (ref 0–0.9)
LYMPHOCYTES # BLD AUTO: 2.36 K/UL — SIGNIFICANT CHANGE UP (ref 1–3.3)
LYMPHOCYTES NFR BLD AUTO: 24.6 % — SIGNIFICANT CHANGE UP (ref 13–44)
MAGNESIUM SERPL-MCNC: 1.8 MG/DL — SIGNIFICANT CHANGE UP (ref 1.6–2.6)
MCHC RBC-ENTMCNC: 20.6 PG — LOW (ref 27–34)
MCHC RBC-ENTMCNC: 29.6 G/DL — LOW (ref 32–36)
MCV RBC AUTO: 69.7 FL — LOW (ref 80–100)
MONOCYTES # BLD AUTO: 0.7 K/UL — SIGNIFICANT CHANGE UP (ref 0–0.9)
MONOCYTES NFR BLD AUTO: 7.3 % — SIGNIFICANT CHANGE UP (ref 2–14)
NEUTROPHILS # BLD AUTO: 6.47 K/UL — SIGNIFICANT CHANGE UP (ref 1.8–7.4)
NEUTROPHILS NFR BLD AUTO: 67.4 % — SIGNIFICANT CHANGE UP (ref 43–77)
NRBC # BLD AUTO: 0 K/UL — SIGNIFICANT CHANGE UP (ref 0–0)
NRBC # FLD: 0 K/UL — SIGNIFICANT CHANGE UP (ref 0–0)
NRBC BLD AUTO-RTO: 0 /100 WBCS — SIGNIFICANT CHANGE UP (ref 0–0)
PHOSPHATE SERPL-MCNC: 3 MG/DL — SIGNIFICANT CHANGE UP (ref 2.5–4.5)
PLATELET # BLD AUTO: 351 K/UL — SIGNIFICANT CHANGE UP (ref 150–400)
PMV BLD: 9 FL — SIGNIFICANT CHANGE UP (ref 7–13)
POTASSIUM SERPL-MCNC: 3.9 MMOL/L — SIGNIFICANT CHANGE UP (ref 3.5–5.3)
POTASSIUM SERPL-SCNC: 3.9 MMOL/L — SIGNIFICANT CHANGE UP (ref 3.5–5.3)
RBC # BLD: 4.02 M/UL — SIGNIFICANT CHANGE UP (ref 3.8–5.2)
RBC # FLD: 16.1 % — HIGH (ref 10.3–14.5)
SODIUM SERPL-SCNC: 137 MMOL/L — SIGNIFICANT CHANGE UP (ref 135–145)
WBC # BLD: 9.6 K/UL — SIGNIFICANT CHANGE UP (ref 3.8–10.5)
WBC # FLD AUTO: 9.6 K/UL — SIGNIFICANT CHANGE UP (ref 3.8–10.5)

## 2025-08-27 PROCEDURE — 84100 ASSAY OF PHOSPHORUS: CPT

## 2025-08-27 PROCEDURE — 80048 BASIC METABOLIC PNL TOTAL CA: CPT

## 2025-08-27 PROCEDURE — 83735 ASSAY OF MAGNESIUM: CPT

## 2025-08-27 PROCEDURE — 86901 BLOOD TYPING SEROLOGIC RH(D): CPT

## 2025-08-27 PROCEDURE — 85027 COMPLETE CBC AUTOMATED: CPT

## 2025-08-27 PROCEDURE — 86900 BLOOD TYPING SEROLOGIC ABO: CPT

## 2025-08-27 PROCEDURE — 36415 COLL VENOUS BLD VENIPUNCTURE: CPT

## 2025-08-27 PROCEDURE — 85025 COMPLETE CBC W/AUTO DIFF WBC: CPT

## 2025-08-27 PROCEDURE — C9399: CPT

## 2025-08-27 PROCEDURE — 94640 AIRWAY INHALATION TREATMENT: CPT

## 2025-08-27 PROCEDURE — 86850 RBC ANTIBODY SCREEN: CPT

## 2025-08-27 PROCEDURE — S2900: CPT

## 2025-08-27 PROCEDURE — C1889: CPT

## 2025-08-27 PROCEDURE — 88307 TISSUE EXAM BY PATHOLOGIST: CPT

## 2025-08-27 RX ORDER — ACETAMINOPHEN 500 MG/5ML
20.3 LIQUID (ML) ORAL
Qty: 2436 | Refills: 0
Start: 2025-08-27 | End: 2025-09-25

## 2025-08-27 RX ORDER — ENOXAPARIN SODIUM 100 MG/ML
40 INJECTION SUBCUTANEOUS EVERY 12 HOURS
Refills: 0 | Status: DISCONTINUED | OUTPATIENT
Start: 2025-08-27 | End: 2025-08-27

## 2025-08-27 RX ORDER — OMEPRAZOLE 20 MG/1
1 CAPSULE, DELAYED RELEASE ORAL
Qty: 30 | Refills: 0
Start: 2025-08-27 | End: 2025-09-25

## 2025-08-27 RX ORDER — ALBUTEROL SULFATE 2.5 MG/3ML
2 VIAL, NEBULIZER (ML) INHALATION
Qty: 0 | Refills: 0 | DISCHARGE
Start: 2025-08-27

## 2025-08-27 RX ORDER — MAGNESIUM SULFATE 500 MG/ML
1 SYRINGE (ML) INJECTION ONCE
Refills: 0 | Status: COMPLETED | OUTPATIENT
Start: 2025-08-27 | End: 2025-08-27

## 2025-08-27 RX ORDER — ONDANSETRON HCL/PF 4 MG/2 ML
1 VIAL (ML) INJECTION
Qty: 3 | Refills: 0
Start: 2025-08-27

## 2025-08-27 RX ADMIN — Medication 1000 MILLIGRAM(S): at 12:14

## 2025-08-27 RX ADMIN — IPRATROPIUM BROMIDE AND ALBUTEROL SULFATE 3 MILLILITER(S): .5; 2.5 SOLUTION RESPIRATORY (INHALATION) at 05:22

## 2025-08-27 RX ADMIN — ENOXAPARIN SODIUM 40 MILLIGRAM(S): 100 INJECTION SUBCUTANEOUS at 09:25

## 2025-08-27 RX ADMIN — Medication 40 MILLIGRAM(S): at 12:14

## 2025-08-27 RX ADMIN — IRON SUCROSE 68.75 MILLIGRAM(S): 20 INJECTION, SOLUTION INTRAVENOUS at 14:06

## 2025-08-27 RX ADMIN — Medication 1000 MILLIGRAM(S): at 05:22

## 2025-08-27 RX ADMIN — Medication 100 GRAM(S): at 10:31

## 2025-08-27 RX ADMIN — Medication 20 MILLIEQUIVALENT(S): at 10:31

## 2025-08-27 RX ADMIN — Medication 105 MILLIGRAM(S): at 14:06

## 2025-08-29 ENCOUNTER — NON-APPOINTMENT (OUTPATIENT)
Age: 35
End: 2025-08-29

## 2025-09-04 DIAGNOSIS — G47.33 OBSTRUCTIVE SLEEP APNEA (ADULT) (PEDIATRIC): ICD-10-CM

## 2025-09-04 DIAGNOSIS — E66.01 MORBID (SEVERE) OBESITY DUE TO EXCESS CALORIES: ICD-10-CM

## 2025-09-04 DIAGNOSIS — E78.5 HYPERLIPIDEMIA, UNSPECIFIED: ICD-10-CM

## 2025-09-04 DIAGNOSIS — R73.03 PREDIABETES: ICD-10-CM

## 2025-09-04 DIAGNOSIS — D50.9 IRON DEFICIENCY ANEMIA, UNSPECIFIED: ICD-10-CM

## 2025-09-04 DIAGNOSIS — J45.909 UNSPECIFIED ASTHMA, UNCOMPLICATED: ICD-10-CM

## 2025-09-18 ENCOUNTER — APPOINTMENT (OUTPATIENT)
Dept: BARIATRICS | Facility: CLINIC | Age: 35
End: 2025-09-18

## (undated) DEVICE — SUT ETHIBOND EXCEL 2-0 36" SH

## (undated) DEVICE — Device

## (undated) DEVICE — XI OBTURATOR OPTICAL BLADELESS 8MM

## (undated) DEVICE — D HELP - CLEARVIEW CLEARIFY SYSTEM

## (undated) DEVICE — SUT STRATAFIX SPIRAL PDS PLUS 2-0 30CM SH

## (undated) DEVICE — XI 12MM AND STAPLER CANNULA SEAL

## (undated) DEVICE — MARKING PEN W RULER

## (undated) DEVICE — DRSG DERMABOND 0.7ML

## (undated) DEVICE — XI VESSEL SEALER

## (undated) DEVICE — PACK GENERAL LAPAROSCOPY

## (undated) DEVICE — XI ARM SCISSOR MONO CURVED

## (undated) DEVICE — XI ARM FORCEP CADIERE 8MM

## (undated) DEVICE — SUT VICRYL 3-0 27" SH

## (undated) DEVICE — XI STAPLER SUREFORM 60

## (undated) DEVICE — XI ARM NEEDLE DRIVER LARGE

## (undated) DEVICE — XI CORD BIPOLAR CAUTERY (BLUE)

## (undated) DEVICE — SUT VICRYL PLUS 0 54" TIES UNDYED

## (undated) DEVICE — XI SEAL UNIVERSIAL 5-12MM

## (undated) DEVICE — ELCTR BOVIE PENCIL BLADE 10FT

## (undated) DEVICE — XI ENDOWRIST 12 - 8 MM CANNULA REDUCER

## (undated) DEVICE — TUBING AIRSEAL TRI-LUMEN FILTERED

## (undated) DEVICE — INSUFFLATION NDL COVIDIEN SURGINEEDLE VERESS 150MM LONG

## (undated) DEVICE — XI CORD MONOPOLAR CAUTERY (GREEN)

## (undated) DEVICE — SUT MONOCRYL 4-0 27" PS-2 UNDYED

## (undated) DEVICE — BLADE SCALPEL SAFETYLOCK #15

## (undated) DEVICE — VENODYNE/SCD SLEEVE CALF MEDIUM